# Patient Record
Sex: MALE | Race: WHITE | NOT HISPANIC OR LATINO | Employment: OTHER | URBAN - METROPOLITAN AREA
[De-identification: names, ages, dates, MRNs, and addresses within clinical notes are randomized per-mention and may not be internally consistent; named-entity substitution may affect disease eponyms.]

---

## 2019-09-11 ENCOUNTER — OFFICE VISIT (OUTPATIENT)
Dept: FAMILY MEDICINE | Facility: CLINIC | Age: 69
End: 2019-09-11
Payer: MEDICARE

## 2019-09-11 VITALS
TEMPERATURE: 97.7 F | DIASTOLIC BLOOD PRESSURE: 64 MMHG | RESPIRATION RATE: 16 BRPM | OXYGEN SATURATION: 98 % | HEART RATE: 79 BPM | WEIGHT: 175 LBS | SYSTOLIC BLOOD PRESSURE: 111 MMHG

## 2019-09-11 DIAGNOSIS — F19.20 POLYSUBSTANCE DEPENDENCE (H): ICD-10-CM

## 2019-09-11 DIAGNOSIS — C92.10 CML (CHRONIC MYELOCYTIC LEUKEMIA) (H): ICD-10-CM

## 2019-09-11 DIAGNOSIS — K59.09 OTHER CONSTIPATION: ICD-10-CM

## 2019-09-11 DIAGNOSIS — R29.6 FREQUENT FALLS: ICD-10-CM

## 2019-09-11 DIAGNOSIS — F19.97: ICD-10-CM

## 2019-09-11 DIAGNOSIS — E72.20 HYPERAMMONEMIA (H): ICD-10-CM

## 2019-09-11 DIAGNOSIS — F20.0 PARANOID SCHIZOPHRENIA (H): ICD-10-CM

## 2019-09-11 DIAGNOSIS — F60.0 PARANOID PERSONALITY (DISORDER) (H): ICD-10-CM

## 2019-09-11 DIAGNOSIS — R56.9 CONVULSIONS, UNSPECIFIED CONVULSION TYPE (H): ICD-10-CM

## 2019-09-11 DIAGNOSIS — B18.2 HEPATITIS C VIRUS CARRIER STATE (H): ICD-10-CM

## 2019-09-11 DIAGNOSIS — Z00.00 ROUTINE GENERAL MEDICAL EXAMINATION AT A HEALTH CARE FACILITY: Primary | ICD-10-CM

## 2019-09-11 DIAGNOSIS — H91.90 HEARING LOSS, UNSPECIFIED HEARING LOSS TYPE, UNSPECIFIED LATERALITY: ICD-10-CM

## 2019-09-11 DIAGNOSIS — Z23 NEED FOR PROPHYLACTIC VACCINATION AND INOCULATION AGAINST INFLUENZA: ICD-10-CM

## 2019-09-11 DIAGNOSIS — J44.9 CHRONIC OBSTRUCTIVE PULMONARY DISEASE, UNSPECIFIED COPD TYPE (H): ICD-10-CM

## 2019-09-11 DIAGNOSIS — F17.200 SMOKER: ICD-10-CM

## 2019-09-11 DIAGNOSIS — G40.419: ICD-10-CM

## 2019-09-11 DIAGNOSIS — R32 URINARY INCONTINENCE, UNSPECIFIED TYPE: ICD-10-CM

## 2019-09-11 DIAGNOSIS — S06.9X9S TRAUMATIC BRAIN INJURY WITH LOSS OF CONSCIOUSNESS, SEQUELA (H): ICD-10-CM

## 2019-09-11 DIAGNOSIS — K21.9 GASTROESOPHAGEAL REFLUX DISEASE, ESOPHAGITIS PRESENCE NOT SPECIFIED: ICD-10-CM

## 2019-09-11 DIAGNOSIS — H53.2 MONOCULAR DIPLOPIA: ICD-10-CM

## 2019-09-11 PROBLEM — G40.909 SEIZURE DISORDER (H): Status: ACTIVE | Noted: 2019-09-11

## 2019-09-11 PROCEDURE — 99204 OFFICE O/P NEW MOD 45 MIN: CPT | Mod: 25 | Performed by: PHYSICIAN ASSISTANT

## 2019-09-11 PROCEDURE — 90662 IIV NO PRSV INCREASED AG IM: CPT | Performed by: PHYSICIAN ASSISTANT

## 2019-09-11 PROCEDURE — G0008 ADMIN INFLUENZA VIRUS VAC: HCPCS | Performed by: PHYSICIAN ASSISTANT

## 2019-09-11 RX ORDER — ALBUTEROL SULFATE 90 UG/1
2 AEROSOL, METERED RESPIRATORY (INHALATION) EVERY 6 HOURS
Qty: 3 INHALER | Refills: 1 | Status: SHIPPED | OUTPATIENT
Start: 2019-09-11 | End: 2019-10-10

## 2019-09-11 RX ORDER — POLYETHYLENE GLYCOL 3350 17 G
POWDER IN PACKET (EA) ORAL
COMMUNITY
Start: 2017-06-16 | End: 2019-09-11

## 2019-09-11 RX ORDER — ACETAMINOPHEN 325 MG/1
TABLET ORAL
COMMUNITY
Start: 2019-02-02 | End: 2020-01-01

## 2019-09-11 RX ORDER — FAMOTIDINE 20 MG/1
20 TABLET, FILM COATED ORAL 2 TIMES DAILY
COMMUNITY
Start: 2019-07-04 | End: 2019-09-11

## 2019-09-11 RX ORDER — OXCARBAZEPINE 600 MG/1
600 TABLET, FILM COATED ORAL 2 TIMES DAILY
COMMUNITY
Start: 2019-08-29

## 2019-09-11 RX ORDER — QUETIAPINE FUMARATE 50 MG/1
50 TABLET, FILM COATED ORAL 4 TIMES DAILY
COMMUNITY
Start: 2019-09-04

## 2019-09-11 RX ORDER — BUDESONIDE AND FORMOTEROL FUMARATE DIHYDRATE 80; 4.5 UG/1; UG/1
2 AEROSOL RESPIRATORY (INHALATION) DAILY
Qty: 3 INHALER | Refills: 1 | Status: SHIPPED | OUTPATIENT
Start: 2019-09-11 | End: 2020-01-01 | Stop reason: DRUGHIGH

## 2019-09-11 RX ORDER — QUETIAPINE 300 MG/1
300 TABLET, FILM COATED, EXTENDED RELEASE ORAL AT BEDTIME
COMMUNITY
Start: 2019-03-19

## 2019-09-11 RX ORDER — VENLAFAXINE HYDROCHLORIDE 75 MG/1
75 CAPSULE, EXTENDED RELEASE ORAL DAILY
COMMUNITY
Start: 2019-08-29

## 2019-09-11 RX ORDER — NICOTINE 21 MG/24HR
1 PATCH, TRANSDERMAL 24 HOURS TRANSDERMAL EVERY 24 HOURS
Qty: 30 PATCH | Refills: 0 | Status: SHIPPED | OUTPATIENT
Start: 2019-09-11 | End: 2019-12-04

## 2019-09-11 RX ORDER — PHENYTOIN SODIUM 100 MG/1
100 CAPSULE, EXTENDED RELEASE ORAL 2 TIMES DAILY
COMMUNITY
Start: 2018-07-31

## 2019-09-11 RX ORDER — POLYETHYLENE GLYCOL 3350 17 G
POWDER IN PACKET (EA) ORAL
Qty: 169 LOZENGE | Refills: 1 | Status: SHIPPED | OUTPATIENT
Start: 2019-09-11 | End: 2019-10-25

## 2019-09-11 RX ORDER — HALOPERIDOL 10 MG/1
10 TABLET ORAL AT BEDTIME
COMMUNITY
Start: 2019-08-29

## 2019-09-11 RX ORDER — BUDESONIDE AND FORMOTEROL FUMARATE DIHYDRATE 80; 4.5 UG/1; UG/1
2 AEROSOL RESPIRATORY (INHALATION) DAILY
COMMUNITY
End: 2019-09-11

## 2019-09-11 RX ORDER — FAMOTIDINE 20 MG/1
20 TABLET, FILM COATED ORAL 2 TIMES DAILY
Qty: 180 TABLET | Refills: 1 | Status: SHIPPED | OUTPATIENT
Start: 2019-09-11 | End: 2020-01-01

## 2019-09-11 RX ORDER — HALOPERIDOL DECANOATE 100 MG/ML
100 INJECTION INTRAMUSCULAR
COMMUNITY
Start: 2019-08-21

## 2019-09-11 RX ORDER — CLONAZEPAM 0.5 MG/1
0.5 TABLET ORAL 2 TIMES DAILY
COMMUNITY
Start: 2019-09-06

## 2019-09-11 RX ORDER — ALBUTEROL SULFATE 1.25 MG/3ML
1.25 SOLUTION RESPIRATORY (INHALATION) EVERY 6 HOURS PRN
Qty: 3 ML | Refills: 3 | Status: SHIPPED | OUTPATIENT
Start: 2019-09-11 | End: 2019-12-04

## 2019-09-11 RX ORDER — NICOTINE 21 MG/24HR
1 PATCH, TRANSDERMAL 24 HOURS TRANSDERMAL EVERY 24 HOURS
Qty: 30 PATCH | Refills: 0 | Status: SHIPPED | OUTPATIENT
Start: 2019-09-11 | End: 2019-10-30

## 2019-09-11 NOTE — PROGRESS NOTES
"  SUBJECTIVE:   CC: Espinoza Willoughby is an 69 year old male who presents for preventive health visit.     Healthy Habits:    Do you get at least three servings of calcium containing foods daily (dairy, green leafy vegetables, etc.)? { :565659::\"yes\"}    Amount of exercise or daily activities, outside of work: { :310206}    Problems taking medications regularly { :042811::\"No\"}    Medication side effects: { :072814::\"No\"}    Have you had an eye exam in the past two years? { :877701}    Do you see a dentist twice per year? { :735317}    Do you have sleep apnea, excessive snoring or daytime drowsiness?{ :064203}  {Outside tests to abstract? :722264}    {additional problems to add (Optional):410538}    Today's PHQ-2 Score: No flowsheet data found.  {PHQ-2 LOOK IN ASSESSMENTS (Optional) :414358}  Abuse: Current or Past(Physical, Sexual or Emotional)- {YES/NO/NA:900431}  Do you feel safe in your environment? {YES/NO/NA:479025}    Social History     Tobacco Use     Smoking status: Not on file   Substance Use Topics     Alcohol use: Not on file     If you drink alcohol do you typically have >3 drinks per day or >7 drinks per week? {ETOH :804707}                      Last PSA: No results found for: PSA    Reviewed orders with patient. Reviewed health maintenance and updated orders accordingly - {Yes/No:796727::\"Yes\"}  {Chronicprobdata (Optional):536108}    Reviewed and updated as needed this visit by clinical staff         Reviewed and updated as needed this visit by Provider        {HISTORY OPTIONS (Optional):343911}    ROS:  { :839554::\"CONSTITUTIONAL: NEGATIVE for fever, chills, change in weight\",\"INTEGUMENTARY/SKIN: NEGATIVE for worrisome rashes, moles or lesions\",\"EYES: NEGATIVE for vision changes or irritation\",\"ENT: NEGATIVE for ear, mouth and throat problems\",\"RESP: NEGATIVE for significant cough or SOB\",\"CV: NEGATIVE for chest pain, palpitations or peripheral edema\",\"GI: NEGATIVE for nausea, abdominal pain, " "heartburn, or change in bowel habits\",\" male: negative for dysuria, hematuria, decreased urinary stream, erectile dysfunction, urethral discharge\",\"MUSCULOSKELETAL: NEGATIVE for significant arthralgias or myalgia\",\"NEURO: NEGATIVE for weakness, dizziness or paresthesias\",\"PSYCHIATRIC: NEGATIVE for changes in mood or affect\"}    OBJECTIVE:   There were no vitals taken for this visit.  EXAM:  {Exam Choices:814472}    {Diagnostic Test Results (Optional):381375::\"Diagnostic Test Results:\",\"Labs reviewed in Epic\"}    ASSESSMENT/PLAN:   {Diag Picklist:659056}    COUNSELING:  {MALE COUNSELING MESSAGES:076348::\"Reviewed preventive health counseling, as reflected in patient instructions\"}    There is no height or weight on file to calculate BMI.    {Weight Management Plan (ACO) Complete if BMI is abnormal-  Ages 18-64  BMI >24.9.  Age 65+ with BMI <23 or >30 (Optional):412591}     has no tobacco history on file.  {Tobacco Cessation -- Complete if patient is a smoker (Optional):325544}    Counseling Resources:  ATP IV Guidelines  Pooled Cohorts Equation Calculator  FRAX Risk Assessment  ICSI Preventive Guidelines  Dietary Guidelines for Americans, 2010  USDA's MyPlate  ASA Prophylaxis  Lung CA Screening    Andres Rubio PA-C  [facility]  "

## 2019-09-11 NOTE — PATIENT INSTRUCTIONS
Preventive Health Recommendations:     See your health care provider every year to    Review health changes.     Discuss preventive care.      Review your medicines if your doctor has prescribed any.      Talk with your health care provider about whether you should have a test to screen for prostate cancer (PSA).    Every 3 years, have a diabetes test (fasting glucose). If you are at risk for diabetes, you should have this test more often.    Every 5 years, have a cholesterol test. Have this test more often if you are at risk for high cholesterol or heart disease.     Every 10 years, have a colonoscopy. Or, have a yearly FIT test (stool test). These exams will check for colon cancer.    Talk to with your health care provider about screening for Abdominal Aortic Aneurysm if you have a family history of AAA or have a history of smoking.    Shots:     Get a flu shot each year.     Get a tetanus shot every 10 years.     Talk to your doctor about your pneumonia vaccines. There are now two you should receive - Pneumovax (PPSV 23) and Prevnar (PCV 13).     Talk to your pharmacist about a shingles vaccine.     Talk to your doctor about the hepatitis B vaccine.  Nutrition:     Eat at least 5 servings of fruits and vegetables each day.     Eat whole-grain bread, whole-wheat pasta and brown rice instead of white grains and rice.     Get adequate Calcium and Vitamin D.   Lifestyle    Exercise for at least 150 minutes a week (30 minutes a day, 5 days a week). This will help you control your weight and prevent disease.     Limit alcohol to one drink per day.     No smoking.     Wear sunscreen to prevent skin cancer.    See your dentist every six months for an exam and cleaning.    See your eye doctor every 1 to 2 years to screen for conditions such as glaucoma, macular degeneration, cataracts, etc.    Personalized Prevention Plan  You are due for the preventive services outlined below.  Your care team is available to assist you  in scheduling these services.  If you have already completed any of these items, please share that information with your care team to update in your medical record.  Health Maintenance Due   Topic Date Due     Hepatitis C Screening  1950     Discuss Advance Care Planning  1950     Colonscopy  07/17/1960     Diptheria Tetanus Pertussis (DTAP/TDAP/TD) Vaccine (1 - Tdap) 07/17/1975     Cholesterol Lab  07/17/1985     Annual Wellness Visit  07/17/2015     FALL RISK ASSESSMENT  07/17/2015     Pneumococcal Vaccine (1 of 2 - PCV13) 07/17/2015     AORTIC ANEURYSM SCREENING (SYSTEM ASSIGNED)  07/17/2015     Zoster (Shingles) Vaccine (2 of 3) 12/26/2016     PHQ-2  01/01/2019     Flu Vaccine (1) 09/01/2019

## 2019-09-11 NOTE — TELEPHONE ENCOUNTER
ONCOLOGY INTAKE: Records Information      APPT INFORMATION: [id number] Dr. Apollo Yuan  Referring provider:  Andres Rubio  Referring provider s clinic:   [facility]  Reason for visit/diagnosis:  CML (chronic myelocytic leukemia) (H) [C92.10]  Has patient been notified of appointment date and time?: Yes    RECORDS INFORMATION:  Were the records received with the referral (via [employer])? Internal Referral    Has patient been seen for any external appt for this diagnosis? No    If yes, where? NA    Has patient had any imaging or procedures outside of [facility] for this condition? No      If Yes, where? NA    ADDITIONAL INFORMATION:  None

## 2019-09-11 NOTE — PROGRESS NOTES
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 (H) (9/11/2019), Hearing loss, unspecified hearing loss type, unspecified laterality (9/11/2019), Hepatitis C virus carrier state (H) (9/11/2019), Hyperammonemia (H) (9/11/2019), Monocular diplopia (9/11/2019), Other constipation (9/11/2019), Paranoid personality (disorder) (H) (9/11/2019), Paranoid schizophrenia (H) (9/11/2019), Polysubstance dependence (H) (9/11/2019), Seizure disorder (H) (9/11/2019), Smoker (9/11/2019), Substance-induced persisting dementia (H) (9/11/2019), Traumatic brain injury with loss of consciousness, sequela (H) (9/11/2019), and Urinary incontinence, unspecified type (9/11/2019).   who presents to clinic today for the following health issues:    Preventive Visit. New patient to me to establish care.   He has moved to the area from [place]. No records available.   Lives in group home and here with 2 care givers.     Are you in the first 12 months of your Medicare Part B coverage?  No    Physical Health:    In general, how would you rate your overall physical health? poor    Outside of work, how many days during the week do you exercise? none    Outside of work, approximately how many minutes a day do you exercise?not applicable    If you drink alcohol do you typically have >3 drinks per day or >7 drinks per week? No    Do you usually eat at least 4 servings of fruit and vegetables a day, include whole grains & fiber and avoid regularly eating high fat or \"junk\" foods? Yes    Do you have any problems taking medications regularly?  No    Do you have any side effects from medications? none    Needs assistance for the " following daily activities: currently as he is going through chemo    Which of the following safety concerns are present in your home?  none identified     Hearing impairment: No    In the past 6 months, have you been bothered by leaking of urine? no    Mental Health:    In general, how would you rate your overall mental or emotional health? Fair going through chemo  PHQ-2 Score:      Do you feel safe in your environment? Yes        Additional concerns to address?  YES    Fall risk:  Fallen 2 or more times in the past year?: Yes  Any fall with injury in the past year?: No  Timed Up and Go Test (>13.5 is fall risk; contact physician) : 6  click delete button to remove this line now  Cognitive Screening: Not appropriate due to known dementia/ TBI      Here with care givers and no past medical history information. New to facility for about 1 wk.     Here to establish care :  Unsteady gait would like handicap parking permit- he don't drive.   Leukemia- history of CML and establishing new oncology  Copd concerns as he has chronic bronchitis  Discuss quitting smoking  History of mental health and TBI.   - see's oncology, neurology and psychiatry in the past and needs referrals.       Reviewed and updated as needed this visit by clinical staff  Tobacco  Allergies  Meds  Problems  Med Hx  Surg Hx  Fam Hx  Soc Hx          Reviewed and updated as needed this visit by Provider  Tobacco  Allergies  Meds  Problems  Med Hx  Surg Hx  Fam Hx        Social History     Tobacco Use     Smoking status: Current Every Day Smoker     Smokeless tobacco: Never Used   Substance Use Topics     Alcohol use: Yes                           Current providers sharing in care for this patient include:   Patient Care Team:  Clinic, [facility] as PCP - General (Clinic)  Clinic [facility] (Clinic)    The following health maintenance items are reviewed in Epic and correct as of today:  Health Maintenance   Topic Date Due      SPIROMETRY  1950     HEPATITIS C SCREENING  1950     ADVANCE CARE PLANNING  1950     COPD ACTION PLAN  1950     COLONOSCOPY  07/17/1960     LIPID  07/17/1985     MEDICARE ANNUAL WELLNESS VISIT  07/17/2015     AORTIC ANEURYSM SCREENING (SYSTEM ASSIGNED)  07/17/2015     ZOSTER IMMUNIZATION (2 of 3) 12/26/2016     INFLUENZA VACCINE (1) 09/01/2019     FALL RISK ASSESSMENT  09/11/2020     DTAP/TDAP/TD IMMUNIZATION (2 - Td) 06/28/2021     PHQ-2  Completed     PNEUMOCOCCAL IMMUNIZATION 65+ HIGH/HIGHEST RISK  Completed     IPV IMMUNIZATION  Aged Out     MENINGITIS IMMUNIZATION  Aged Out     Labs reviewed in EPIC      ROS:  Constitutional, HEENT, cardiovascular, pulmonary, gi and gu systems are negative, except as otherwise noted.    OBJECTIVE:   /64   Pulse 79   Temp 97.7  F (36.5  C) (Oral)   Resp 16   Wt 79.4 kg (175 lb)   SpO2 98%  There is no height or weight on file to calculate BMI.  EXAM:   GENERAL: healthy, alert and no distress  EYES: Eyes grossly normal to inspection, PERRL and conjunctivae and sclerae normal  HENT: ear canals and TM's normal, nose and mouth without ulcers or lesions  NECK: no adenopathy, no asymmetry, masses, or scars and thyroid normal to palpation  RESP: lungs clear to auscultation - no rales, rhonchi or wheezes  CV: regular rate and rhythm, normal S1 S2, no S3 or S4, no murmur, click or rub, no peripheral edema and peripheral pulses strong  ABDOMEN: soft, nontender, no hepatosplenomegaly, no masses and bowel sounds normal   (male): deferred  NEURO: Normal strength and tone, mentation intact and speech normal  PSYCH: mentation appears flat, random thoughts.     Diagnostic Test Results:  Labs reviewed in Epic    ASSESSMENT / PLAN:       ICD-10-CM    1. Routine general medical examination at a health care facility Z00.00    2. Traumatic brain injury with loss of consciousness, sequela (H) S06.9X9S aspirin (ASPIRIN) 81 MG EC tablet     order for [facility]      NEUROLOGY ADULT REFERRAL   3. CML (chronic myelocytic leukemia) (H) C92.10 Oncology/Hematology Adult Referral   4. Smoker F17.200 nicotine (COMMIT) 2 MG lozenge     nicotine (NICODERM CQ) 21 MG/24HR 24 hr patch     nicotine (NICODERM CQ) 14 MG/24HR 24 hr patch     nicotine (NICODERM CQ) 7 MG/24HR 24 hr patch   5. Chronic obstructive pulmonary disease, unspecified COPD type (H) J44.9 budesonide-formoterol (SYMBICORT) 80-4.5 MCG/ACT Inhaler     albuterol (PROAIR HFA/PROVENTIL HFA/VENTOLIN HFA) 108 (90 Base) MCG/ACT inhaler     albuterol (ACCUNEB) 1.25 MG/3ML neb solution   6. Gastroesophageal reflux disease, esophagitis presence not specified K21.9 famotidine (PEPCID) 20 MG tablet   7. Hearing loss, unspecified hearing loss type, unspecified laterality H91.90 AUDIOLOGY ADULT REFERRAL   8. Urinary incontinence, unspecified type R32 order for DME   9. Frequent falls R29.6 order for DME   10. Paranoid schizophrenia (H) F20.0    11. Monocular diplopia H53.2    12. Convulsions, unspecified convulsion type (H) R56.9    13. Hepatitis C virus carrier state (H) B18.2    14. Other constipation K59.09    15. Hyperammonemia (H) E72.20    16. Polysubstance dependence (H) F19.20    17. Substance-induced persisting dementia (H) F19.97    18. Paranoid personality (disorder) (H) F60.0    19. 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 Need for prophylactic vaccination and inoculation against influenza Z23 FLU VACCINE, INCREASED ANTIGEN, PRESV FREE, AGE 65+ [45871]     ADMIN INFLUENZA (For MEDICARE Patients ONLY) []   history of multiple medical problems.   Is going to establish with psychiatry at [facility].   Referrals placed for oncology and neurology.   warning signs discussed.  meds refilled.   Recheck 6 month.     End of Life Planning:  Patient currently has an advanced directive: unknown    COUNSELING:  Reviewed preventive health counseling, as reflected in patient instructions       Regular exercise        Healthy diet/nutrition       Hearing screening    There is no height or weight on file to calculate BMI.         reports that he has been smoking.  He has never used smokeless tobacco.  Tobacco Cessation Action Plan: Pharmacotherapies : Nicotine patch    Appropriate preventive services were discussed with this patient, including applicable screening as appropriate for cardiovascular disease, diabetes, osteopenia/osteoporosis, and glaucoma.  As appropriate for age/gender, discussed screening for colorectal cancer, prostate cancer, breast cancer, and cervical cancer. Checklist reviewing preventive services available has been given to the patient.    Reviewed patients plan of care and provided an AVS. The Basic Care Plan (routine screening as documented in Health Maintenance) for Espinoza meets the Care Plan requirement. This Care Plan has been established and reviewed with the Patient and caregiver.    Counseling Resources:  ATP IV Guidelines  Pooled Cohorts Equation Calculator  Breast Cancer Risk Calculator  FRAX Risk Assessment  ICSI Preventive Guidelines  Dietary Guidelines for Americans, 2010  USDA's MyPlate  ASA Prophylaxis  Lung CA Screening    Andres Rubio PA-C  [facility]

## 2019-09-12 ENCOUNTER — TELEPHONE (OUTPATIENT)
Dept: FAMILY MEDICINE | Facility: CLINIC | Age: 69
End: 2019-09-12

## 2019-09-12 DIAGNOSIS — S06.9X9S TRAUMATIC BRAIN INJURY WITH LOSS OF CONSCIOUSNESS, SEQUELA (H): Primary | ICD-10-CM

## 2019-09-12 DIAGNOSIS — J44.9 CHRONIC OBSTRUCTIVE PULMONARY DISEASE, UNSPECIFIED COPD TYPE (H): Primary | ICD-10-CM

## 2019-09-12 RX ORDER — ALBUTEROL SULFATE 90 UG/1
2 AEROSOL, METERED RESPIRATORY (INHALATION) EVERY 6 HOURS
Qty: 3 INHALER | Refills: 0 | Status: SHIPPED | OUTPATIENT
Start: 2019-09-12 | End: 2019-10-10

## 2019-09-12 NOTE — TELEPHONE ENCOUNTER
[facility] was recommended below but they don't see patients for Neurology, just mainly OT & PT.  Does the patient need OT/PT services?  Or do you want to refer him to Neurology such as [facility]?  Please clarify.  Thank you.  Tabby Mello,

## 2019-09-12 NOTE — TELEPHONE ENCOUNTER
LVM for pt re: [facility]    Imaging from  Requested, reports in CE.     RECORDS STATUS - ALL OTHER DIAGNOSIS      [facility] Tracking ( - Path): [id number]    RECORDS RECEIVED FROM: [facility]   DATE RECEIVED:    NOTES STATUS DETAILS   OFFICE NOTE from referring provider Epic FP: [place]  Recs from  in [facility] - Requested    OFFICE NOTE from medical oncologist Epic/ Onc: 8/23/19 - Laura Palacios   DISCHARGE SUMMARY from hospital     DISCHARGE REPORT from the ER [facility]/ 7/8/19, 6/28/19, 6/27/19   OPERATIVE REPORT     MEDICATION LIST     CLINICAL TRIAL TREATMENTS TO DATE     LABS     PATHOLOGY REPORTS , Report in CE, Slides received from  and taken to pathology 9/20/19 Date of Collection: 7/10/19, Case Number: [id number]   ANYTHING RELATED TO DIAGNOSIS Epic/ 7/25/19   GENONOMIC TESTING     TYPE:     IMAGING (NEED IMAGES & REPORT)     XR Chest , Report in CE, Requested 7/9/19, 6/28/19   CT SCANS , Report in CE, Requested 7/15/19   MRI     MAMMO     ULTRASOUND     PET

## 2019-09-12 NOTE — TELEPHONE ENCOUNTER
----- Message from Kavya Turcios sent at 9/12/2019 12:35 PM CDT -----  Regarding: Neuro TBI referral, dx not seen at [facility] or   Hello,  Provider Andres Rubio has placed a neurology referral for this patient however, [facility] nor [facility] see for the diagnosis Traumatic brain injury with loss of consciousness, sequela. These are referred to [facility]: [phone] or [facility]: [phone].  Please assist your patient at scheduling an alternative location. Thanks!    Thank you kindly,  Kavya BECKFORD

## 2019-09-17 ENCOUNTER — TRANSFERRED RECORDS (OUTPATIENT)
Dept: HEALTH INFORMATION MANAGEMENT | Facility: CLINIC | Age: 69
End: 2019-09-17

## 2019-09-18 NOTE — TELEPHONE ENCOUNTER
Spoke w/ Jami @ Pt's Group Home, she is is Care Coordinator - She did not seem to know that patient had an appointment scheduled with the [facility] - there are numerous references to [facility], as recent as [email or web address] (documentation only, [facility] Care [facility] - [place])     Pt has an appointment on 9/20 @ [facility] @ 12pm. Jami did not realize we were separate entities. Per Jami, they will keep this appointment. Per her, all other recs rec'd.  2:53 PM

## 2019-09-20 ENCOUNTER — PRE VISIT (OUTPATIENT)
Dept: TRANSPLANT | Facility: CLINIC | Age: 69
End: 2019-09-20

## 2019-09-23 PROCEDURE — 00000345 ZZHCL STATISTIC REV BONE MARROW OUTSIDE SLIDES TC 88321: Performed by: INTERNAL MEDICINE

## 2019-09-24 ENCOUNTER — TRANSFERRED RECORDS (OUTPATIENT)
Dept: HEALTH INFORMATION MANAGEMENT | Facility: CLINIC | Age: 69
End: 2019-09-24

## 2019-09-24 LAB — COPATH REPORT: NORMAL

## 2019-09-27 ENCOUNTER — TELEPHONE (OUTPATIENT)
Dept: ONCOLOGY | Facility: CLINIC | Age: 69
End: 2019-09-27

## 2019-09-27 NOTE — TELEPHONE ENCOUNTER
Spoke briefly with Tesfaye, staff member at the [facility] where Espinoza lives.  He stated Jami, supervisor, is gone for the day but will be returning on Monday.  He went on to say this is best number to reach her.

## 2019-09-30 ENCOUNTER — TELEPHONE (OUTPATIENT)
Dept: ONCOLOGY | Facility: CLINIC | Age: 69
End: 2019-09-30

## 2019-09-30 NOTE — TELEPHONE ENCOUNTER
Spoke with Daphne, at pt's group home.  She reported that Jami is out today hopefully returning tomorrow.  RN left request for Jami to call back to verify pt is being seen for his hematologic issue; know he may be going to [facility].  Also left direct, not for pt use phone number for Jami to return call.

## 2019-10-01 PROBLEM — M14.679: Status: ACTIVE | Noted: 2017-07-17

## 2019-10-01 PROBLEM — D72.828 NEUTROPHILIC LEUKOCYTOSIS: Status: ACTIVE | Noted: 2019-07-09

## 2019-10-01 PROBLEM — F63.9 IMPULSE CONTROL DISORDER: Status: ACTIVE | Noted: 2019-01-09

## 2019-10-01 PROBLEM — Z79.899 POLYPHARMACY: Status: ACTIVE | Noted: 2018-10-03

## 2019-10-01 PROBLEM — I95.1 ORTHOSTATIC HYPOTENSION: Status: ACTIVE | Noted: 2019-07-15

## 2019-10-01 PROBLEM — R91.8 GROUND GLASS OPACITY PRESENT ON IMAGING OF LUNG: Status: ACTIVE | Noted: 2019-07-12

## 2019-10-01 PROBLEM — J41.8 MIXED SIMPLE AND MUCOPURULENT CHRONIC BRONCHITIS (H): Status: ACTIVE | Noted: 2017-12-21

## 2019-10-01 PROBLEM — D64.9 NORMOCHROMIC NORMOCYTIC ANEMIA: Status: ACTIVE | Noted: 2019-07-11

## 2019-10-01 RX ORDER — DEXAMETHASONE 4 MG/1
TABLET ORAL
Qty: 12 G | Refills: 11 | OUTPATIENT
Start: 2019-10-01

## 2019-10-01 NOTE — TELEPHONE ENCOUNTER
Routing refill request to provider for review/approval because:  Drug not active on patient's medication list    Larissa SUEN, RN

## 2019-10-09 ENCOUNTER — OFFICE VISIT (OUTPATIENT)
Dept: URGENT CARE | Facility: URGENT CARE | Age: 69
End: 2019-10-09
Payer: MEDICARE

## 2019-10-09 VITALS
TEMPERATURE: 99.2 F | DIASTOLIC BLOOD PRESSURE: 61 MMHG | HEART RATE: 103 BPM | SYSTOLIC BLOOD PRESSURE: 133 MMHG | RESPIRATION RATE: 16 BRPM | OXYGEN SATURATION: 97 % | WEIGHT: 180 LBS
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
## 2019-10-09 DIAGNOSIS — T17.320A CHOKING DUE TO FOOD (REGURGITATED), INITIAL ENCOUNTER: Primary | ICD-10-CM

## 2019-10-09 PROCEDURE — 99214 OFFICE O/P EST MOD 30 MIN: CPT | Performed by: INTERNAL MEDICINE

## 2019-10-09 ASSESSMENT — PAIN SCALES - GENERAL: PAINLEVEL: NO PAIN (0)

## 2019-10-09 NOTE — PROGRESS NOTES
Subjective     Espinoza Willoughby is a 69 year old male who presents to clinic today for the following health issues:    HPI       Hospital Follow-up Visit:    Hospital/Nursing Home/IP Rehab Facility: [facility]  Date of Admission: 10/4/19  Date of Discharge: 10/7/19  Reason(s) for Admission: chest pain, diarrhea            Problems taking medications regularly:  None       Medication changes since discharge: see med list        Problems adhering to non-medication therapy:  None    Summary of hospitalization:  CareEverywhere information obtained and reviewed  BRIEF HOSPITAL COURSE     Espinoza Willoughby is a 69 y.o. male with a PMH notable for:   Schizoaffective, Leukemia, HTN, admitted due to chest pain. He thinks it is his chemo drugs.  Work up was negative for ischemia.   Social work asked to see regarding history mental difficulties.  He has dx of paranoid schizophrenia per niece. He lives in assisted living now.  PT OT recc's STR but group home thinks they can manage him. Nothing medical here of concern. -     Atypical chest pain   - neg work up so far  - looks non cardiac    Diagnostic Tests/Treatments reviewed.  Follow up needed: none  Other Healthcare Providers Involved in Patient s Care:         None  Update since discharge: improved.       Post Discharge Medication Reconciliation: discharge medications reconciled and changed, per note/orders (see AVS).  Plan of care communicated with caregiver     Coding guidelines for this visit:  Type of Medical   Decision Making Face-to-Face Visit       within 7 Days of discharge Face-to-Face Visit        within 14 days of discharge   Moderate Complexity [id number]   High Complexity [id number]        Also had a choking episode the other day and was seen in urgent care.  Please see note in chart for details.  He states he is doing better he has no concerns.  He denies any chest pain shortness of breath or any fevers.    Does have a history of off-and-on constipation.  He  states he uses prune juice but is not working very well up.  Occasionally will use MiraLAX.  He like a prescription for that.    Caregiver states that there is some medications that he is currently not taking and would like that updated and sent to his pharmacy group.    He does see his psychiatrist every couple months and oncologist.    Patient Active Problem List   Diagnosis     Paranoid schizophrenia (H)     Monocular diplopia     Seizure disorder (H)     Hepatitis C virus carrier state (H)     Other constipation     Hyperammonemia (H)     Polysubstance dependence (H)     Substance-induced persisting dementia (H)     Paranoid personality (disorder) (H)     Grand mal seizure with intractable epilepsy (H)     Traumatic brain injury with loss of consciousness, sequela (H)     CML (chronic myelocytic leukemia) (H)     Smoker     Chronic obstructive pulmonary disease, unspecified COPD type (H)     Gastroesophageal reflux disease, esophagitis presence not specified     Frequent falls     Urinary incontinence, unspecified type     Hearing loss, unspecified hearing loss type, unspecified laterality     Impulse control disorder     Bilateral high frequency sensorineural hearing loss     Cervical disc displacement     Charcot's joint of foot, unspecified laterality     Chronic hepatitis C (H)     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, sequela (H)     Ground glass opacity present on imaging of lung     History of foot ulcer     Mixed simple and mucopurulent chronic bronchitis (H)     Neutrophilic leukocytosis     Normochromic normocytic anemia     Orthostatic hypotension     Polypharmacy     Vitamin D deficiency     No past surgical history on file.    Social History     Tobacco Use     Smoking status: Current Every Day Smoker     Smokeless tobacco: Never Used   Substance Use Topics     Alcohol use: Yes     No family history on file.      Current Outpatient Medications   Medication Sig Dispense Refill      acetaminophen (MAPAP) 325 MG tablet TAKE TWO  TABLETS (650MG) BY MOUTH EVERY SIX  HOURS AS NEEDED       albuterol (ACCUNEB) 1.25 MG/3ML neb solution Take 1 vial (1.25 mg) by nebulization every 6 hours as needed for shortness of breath / dyspnea or wheezing 3 mL 3     aspirin (ASPIRIN) 81 MG EC tablet Take 1 tablet (81 mg) by mouth daily 90 tablet 3     budesonide-formoterol (SYMBICORT) 80-4.5 MCG/ACT Inhaler Inhale 2 puffs into the lungs daily 3 Inhaler 1     clonazePAM (KLONOPIN) 0.5 MG tablet Take 0.5 mg by mouth 2 times daily        famotidine (PEPCID) 20 MG tablet Take 1 tablet (20 mg) by mouth 2 times daily 180 tablet 1     haloperidol (HALDOL) 10 MG tablet 10 mg At Bedtime        haloperidol decanoate (HALDOL DECANOATE) 100 MG/ML injection Inject 100 mg into the muscle every 30 days        nicotine (COMMIT) 2 MG lozenge Take 1 lozenge by mouth as needed, maximum of 10 lozenges per day. 169 lozenge 1     nicotine (NICODERM CQ) 7 MG/24HR 24 hr patch Place 1 patch onto the skin every 24 hours 30 patch 0     order for DME Equipment being ordered: Adult Briefs 3 Box 11     order for DME Equipment being ordered: [place] belt 1 Device 1     OXcarbazepine (TRILEPTAL) 600 MG tablet Take 600 mg by mouth 2 times daily        phenytoin (DILANTIN) 100 MG capsule Take 100 mg by mouth 2 times daily        polyethylene glycol (MIRALAX/GLYCOLAX) powder Take 1-2 tsp daily as needed 1 Bottle 3     QUEtiapine (SEROQUEL) 50 MG tablet Take 50 mg by mouth 4 times daily        QUEtiapine ER (SEROQUEL XR) 300 MG 24 hr tablet Take 300 mg by mouth At Bedtime 2 caps at bedtime       venlafaxine (EFFEXOR-XR) 75 MG 24 hr capsule Take 75 mg by mouth daily 3 caps every am       nicotine (NICODERM CQ) 14 MG/24HR 24 hr patch Place 1 patch onto the skin every 24 hours (Patient not taking: Reported on 10/10/2019) 30 patch 0     nicotine (NICODERM CQ) 21 MG/24HR 24 hr patch Place 1 patch onto the skin every 24 hours (Patient not taking: Reported on  10/10/2019) 30 patch 0     Allergies   Allergen Reactions     Benadryl [Diphenhydramine]      Bupropion      Seizure     Xanax [Alprazolam]      No lab results found.   BP Readings from Last 3 Encounters:   10/10/19 99/61   10/09/19 133/61   09/11/19 111/64    Wt Readings from Last 3 Encounters:   10/10/19 80.7 kg (178 lb)   10/09/19 81.6 kg (180 lb)   09/11/19 79.4 kg (175 lb)            Reviewed and updated as needed this visit by Provider         Review of Systems   ROS COMP: Constitutional, HEENT, cardiovascular, pulmonary, gi and gu systems are negative, except as otherwise noted.      Objective    BP 99/61   Pulse 89   Temp 98.1  F (36.7  C) (Oral)   Resp 16   Wt 80.7 kg (178 lb)   SpO2 99%   There is no height or weight on file to calculate BMI.  Physical Exam   GENERAL: healthy, alert and no distress  EYES: Eyes grossly normal to inspection, PERRL and conjunctivae and sclerae normal  HENT: ear canals and TM's normal, nose and mouth without ulcers or lesions  NECK: no adenopathy, no asymmetry, masses, or scars and thyroid normal to palpation  RESP: lungs clear to auscultation - no rales, rhonchi or wheezes  CV: regular rate and rhythm, normal S1 S2, no S3 or S4, no murmur, click or rub, no peripheral edema and peripheral pulses strong  ABDOMEN: soft, nontender, no hepatosplenomegaly, no masses and bowel sounds normal    Diagnostic Test Results:  none         Assessment & Plan       ICD-10-CM    1. Other constipation K59.09 polyethylene glycol (MIRALAX/GLYCOLAX) powder   2. Other chest pain R07.89    3. Paranoid schizophrenia (H) F20.0    1.  Okay for MiraLAX as needed  2. symptoms are stable/resolved.  Follow-up 6 months as needed.      Return in about 6 months (around 4/10/2020) for recheck, As Needed.    Andres Rubio PA-C  [facility]

## 2019-10-09 NOTE — PROGRESS NOTES
SUBJECTIVE:  Espinoza Willoughby is an 69 year old male who presents for choked while eating dinner.  Doesn't have teeth so occasionally gags some and will cough to clear.  Today however he received the Heimlich and after three thrusts seemed okay.   Occurred around 4:30.  Normal breathing.  No pain in chest.  No vomiting.  Feels okay now.   Hx provided largely through care worker and some through pt.    PMH:   has a past medical history of Chronic obstructive pulmonary disease, unspecified COPD type (H) (9/11/2019), CML (chronic myelocytic leukemia) (H) (9/11/2019), Frequent falls (9/11/2019), Gastroesophageal reflux disease, esophagitis presence not specified (9/11/2019), Grand mal seizure with intractable epilepsy (H) (9/11/2019), Hearing loss, unspecified hearing loss type, unspecified laterality (9/11/2019), Hepatitis C virus carrier state (H) (9/11/2019), Hyperammonemia (H) (9/11/2019), Monocular diplopia (9/11/2019), Other constipation (9/11/2019), Paranoid personality (disorder) (H) (9/11/2019), Paranoid schizophrenia (H) (9/11/2019), Polysubstance dependence (H) (9/11/2019), Seizure disorder (H) (9/11/2019), Smoker (9/11/2019), Substance-induced persisting dementia (H) (9/11/2019), Traumatic brain injury with loss of consciousness, sequela (H) (9/11/2019), and Urinary incontinence, unspecified type (9/11/2019).  Patient Active Problem List   Diagnosis     Paranoid schizophrenia (H)     Monocular diplopia     Seizure disorder (H)     Hepatitis C virus carrier state (H)     Other constipation     Hyperammonemia (H)     Polysubstance dependence (H)     Substance-induced persisting dementia (H)     Paranoid personality (disorder) (H)     Grand mal seizure with intractable epilepsy (H)     Traumatic brain injury with loss of consciousness, sequela (H)     CML (chronic myelocytic leukemia) (H)     Smoker     Chronic obstructive pulmonary disease, unspecified COPD type (H)     Gastroesophageal reflux disease,  esophagitis presence not specified     Frequent falls     Urinary incontinence, unspecified type     Hearing loss, unspecified hearing loss type, unspecified laterality     Impulse control disorder     Bilateral high frequency sensorineural hearing loss     Cervical disc displacement     Charcot's joint of foot, unspecified laterality     Chronic hepatitis C (H)     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, sequela (H)     Ground glass opacity present on imaging of lung     History of foot ulcer     Mixed simple and mucopurulent chronic bronchitis (H)     Neutrophilic leukocytosis     Normochromic normocytic anemia     Orthostatic hypotension     Polypharmacy     Vitamin D deficiency     Social History     Socioeconomic History     Marital status: Single     Spouse name: None     Number of children: None     Years of education: None     Highest education level: None   Occupational History     None   Social Needs     Financial resource strain: None     Food insecurity:     Worry: None     Inability: None     Transportation needs:     Medical: None     Non-medical: None   Tobacco Use     Smoking status: Current Every Day Smoker     Smokeless tobacco: Never Used   Substance and Sexual Activity     Alcohol use: Yes     Drug use: None     Sexual activity: Yes   Lifestyle     Physical activity:     Days per week: None     Minutes per session: None     Stress: None   Relationships     Social connections:     Talks on phone: None     Gets together: None     Attends Islam service: None     Active member of club or organization: None     Attends meetings of clubs or organizations: None     Relationship status: None     Intimate partner violence:     Fear of current or ex partner: None     Emotionally abused: None     Physically abused: None     Forced sexual activity: None   Other Topics Concern     None   Social History Narrative     None     No family history on file.    ALLERGIES:  Benadryl [diphenhydramine];  Bupropion; and Xanax [alprazolam]    Current Outpatient Medications   Medication     acetaminophen (MAPAP) 325 MG tablet     albuterol (ACCUNEB) 1.25 MG/3ML neb solution     albuterol (PROAIR HFA/PROVENTIL HFA/VENTOLIN HFA) 108 (90 Base) MCG/ACT inhaler     albuterol (PROAIR HFA/PROVENTIL HFA/VENTOLIN HFA) 108 (90 Base) MCG/ACT inhaler     aspirin (ASPIRIN) 81 MG EC tablet     budesonide-formoterol (SYMBICORT) 80-4.5 MCG/ACT Inhaler     clonazePAM (KLONOPIN) 0.5 MG tablet     famotidine (PEPCID) 20 MG tablet     haloperidol (HALDOL) 10 MG tablet     haloperidol decanoate (HALDOL DECANOATE) 100 MG/ML injection     nicotine (COMMIT) 2 MG lozenge     nicotine (NICODERM CQ) 14 MG/24HR 24 hr patch     nicotine (NICODERM CQ) 21 MG/24HR 24 hr patch     nicotine (NICODERM CQ) 7 MG/24HR 24 hr patch     order for DME     order for DME     OXcarbazepine (TRILEPTAL) 600 MG tablet     phenytoin (DILANTIN) 100 MG capsule     QUEtiapine (SEROQUEL) 50 MG tablet     QUEtiapine ER (SEROQUEL XR) 300 MG 24 hr tablet     venlafaxine (EFFEXOR-XR) 75 MG 24 hr capsule     No current facility-administered medications for this visit.          ROS:  ROS is done and is negative for general/constitutional, eye, ENT, Respiratory, cardiovascular, GI, , Skin, musculoskeletal except as noted elsewhere.  All other review of systems negative except as noted elsewhere.      OBJECTIVE:  /61   Pulse 103   Temp 99.2  F (37.3  C) (Tympanic)   Resp 16   Wt 81.6 kg (180 lb)   SpO2 97%   GENERAL APPEARANCE: Alert, in no acute distress  EYES: normal  NOSE:normal  OROPHARYNX:normal except no teeth  NECK:No adenopathy,masses or thyromegaly  RESP: normal and clear to auscultation  CV:regular rate and rhythm and no murmurs, clicks, or gallops  ABDOMEN: Abdomen soft, non-tender. BS normal. No masses, organomegaly  SKIN: no ulcers, lesions or rash  MUSCULOSKELETAL:Musculoskeletal normal.  Chest wall, ribs and back are non-tender.      RESULTS  .  No  results found for this or any previous visit (from the past 48 hour(s)).    ASSESSMENT/PLAN:    ASSESSMENT / PLAN:  (T17.320A) Choking due to food (regurgitated), initial encounter  (primary encounter diagnosis)  Comment: episode of choking resolved with Heimlich maneuver.  No evidence of musculoskeletal trauma or fractures on exam. No evidence of pneumonia at this time. Breathing is normal.  Plan: I reviewed supportive care, and signs of concern including shortness of breath, trouble breathing, cough, or fever.  Follow up as needed or if worsens in any way.  Reviewed red flag symptoms and is to go to the ER if experiences any of these.  Also reviewed choking prevention when eating.      See [facility] for orders, medications, letters, patient instructions    Ekaterina Kern M.D.

## 2019-10-09 NOTE — NURSING NOTE
Chief Complaint   Patient presents with     Choking     pt had a choking episode at about 4:30 today        Initial /61   Pulse 103   Temp 99.2  F (37.3  C) (Tympanic)   Resp 16   Wt 81.6 kg (180 lb)   SpO2 97%  There is no height or weight on file to calculate BMI.  Medication Reconciliation: complete  Keri Dow MA

## 2019-10-10 ENCOUNTER — TELEPHONE (OUTPATIENT)
Dept: FAMILY MEDICINE | Facility: CLINIC | Age: 69
End: 2019-10-10

## 2019-10-10 ENCOUNTER — OFFICE VISIT (OUTPATIENT)
Dept: FAMILY MEDICINE | Facility: CLINIC | Age: 69
End: 2019-10-10
Payer: MEDICARE

## 2019-10-10 VITALS
RESPIRATION RATE: 16 BRPM | HEART RATE: 89 BPM | WEIGHT: 178 LBS | TEMPERATURE: 98.1 F | SYSTOLIC BLOOD PRESSURE: 99 MMHG | DIASTOLIC BLOOD PRESSURE: 61 MMHG | OXYGEN SATURATION: 99 %

## 2019-10-10 DIAGNOSIS — R07.89 OTHER CHEST PAIN: ICD-10-CM

## 2019-10-10 DIAGNOSIS — K59.09 OTHER CONSTIPATION: Primary | ICD-10-CM

## 2019-10-10 DIAGNOSIS — F20.0 PARANOID SCHIZOPHRENIA (H): ICD-10-CM

## 2019-10-10 PROCEDURE — 99213 OFFICE O/P EST LOW 20 MIN: CPT | Performed by: PHYSICIAN ASSISTANT

## 2019-10-10 RX ORDER — POLYETHYLENE GLYCOL 3350 17 G/17G
17 POWDER, FOR SOLUTION ORAL
COMMUNITY
Start: 2017-09-20 | End: 2019-10-10

## 2019-10-10 RX ORDER — POLYETHYLENE GLYCOL 3350 17 G/17G
17 POWDER, FOR SOLUTION ORAL DAILY PRN
Qty: 850 G | Refills: 3 | Status: CANCELLED | OUTPATIENT
Start: 2019-10-10

## 2019-10-10 RX ORDER — POLYETHYLENE GLYCOL 3350 17 G/17G
POWDER, FOR SOLUTION ORAL
Qty: 1 BOTTLE | Refills: 3 | Status: SHIPPED | OUTPATIENT
Start: 2019-10-10

## 2019-10-10 NOTE — LETTER
" My COPD Action Plan     Name: Espinoza Willoughby    YOB: 1950   Date: 10/9/2019    My doctor: Andres Rubio PA-C   My clinic: [address] [address] [phone]  My Controller Medicine: { :508208}   Dose: ***     My Rescue Medicine: { :894931}   Dose: ***     My Flare Up Medicine: { :735118}   Dose: ***     My COPD Severity: { :653086}      Use of Oxygen: { :301995::\"Oxygen Not Prescribed \"}     Make sure you've had your pneumonia   vaccines.          GREEN ZONE       Doing well today      Usual level of activity and exercise    Usual amount of cough and mucus    No shortness of breath    Usual level of health (thinking clearly, sleeping well, feel like eating) Actions:      Take daily medicines    Use oxygen as prescribed    Follow regular exercise and diet plan    Avoid cigarette smoke and other irritants that harm the lungs           YELLOW ZONE          Having a bad day or flare up      Short of breath more than usual    A lot more sputum (mucus) than usual    Sputum looks yellow, green, tan, brown or bloody    More coughing or wheezing    Fever or chills    Less energy; trouble completing activities    Trouble thinking or focusing    Using quick relief inhaler or nebulizer more often    Poor sleep; symptoms wake me up    Do not feel like eating Actions:      Get plenty of rest    Take daily medicines    Use quick relief inhaler every *** hours    If you use oxygen, call you doctor to see if you should adjust your oxygen    Do breathing exercises or other things to help you relax    Let a loved one, friend or neighbor know you are feeling worse    Call your care team if you have 2 or more symptoms.  Start taking steroids or antibiotics if directed by your care team           RED ZONE       Need medical care now      Severe shortness of breath (feel you can't breathe)    Fever, chills    Not enough breath to do any activity    Trouble " coughing up mucus, walking or talking    Blood in mucus    Frequent coughing   Rescue medicines are not working    Not able to sleep because of breathing    Feel confused or drowsy    Chest pain    Actions:      Call your health care team.  If you cannot reach your care team, call 911 or go to the emergency room.        Annual Reminders:  Meet with Care Team, Flu Shot every Fall  Pharmacy: [facility]. - [place], [place] - [address]

## 2019-10-10 NOTE — PATIENT INSTRUCTIONS
Espinoza Willoughby is feeling well today and back to his normal self.     Will add miralax as needed for constipation.     A letter will be sent to his pharmacy with medication changes.     Andres Rubio PA-C

## 2019-10-10 NOTE — TELEPHONE ENCOUNTER
Reason for Call:  Other prescription    Detailed comments: pharmacy is calling to check on dosage amount for miralax. Please call to discuss  Thank you     Phone Number Patient can be reached at: Other phone number:  [phone]    Best Time:     Can we leave a detailed message on this number? YES    Call taken on 10/10/2019 at 2:20 PM by Sandra Blackwell

## 2019-10-10 NOTE — LETTER
[facility]  [address] [facility] [address]  Phone: [phone]    October 10, 2019        Espinoza Willoughby  [address] [facility] [address]          To whom it may concern:    RE: Espinoza Willoughby    Patient was seen and treated today at our clinic. Espinoza Willoughby is no longer needing Nicotine Bhanu 4 mg mint tablets or lactulose, or Flovent.     Please contact me for questions or concerns.      Sincerely,        Andres Rubio PA-C

## 2019-10-10 NOTE — TELEPHONE ENCOUNTER
Medication from pt hx pended.  Please sign if agree with clarification or change it.  Thank you  Kimberly SUEN, RN

## 2019-10-11 ENCOUNTER — TELEPHONE (OUTPATIENT)
Dept: FAMILY MEDICINE | Facility: CLINIC | Age: 69
End: 2019-10-11

## 2019-10-11 DIAGNOSIS — R29.6 FREQUENT FALLS: Primary | ICD-10-CM

## 2019-10-11 NOTE — TELEPHONE ENCOUNTER
Per chart review, pt was recently seen in clinic on 10/10/19 by Andres Rubio PA-C.  Routed to provider to review and advise if need for wheelchair was discussed at office visit yesterday, or if the need was apparent at the office visit.    Joleen Fernandez, BSN, RN

## 2019-10-11 NOTE — TELEPHONE ENCOUNTER
Reason for call:  Other   Patient called regarding (reason for call): call back  Additional comments: needs an referral for an wheelchair    Phone number to reach patient:  Home number on file [phone] (home)    Best Time:  anytime    Can we leave a detailed message on this number?  YES

## 2019-10-11 NOTE — TELEPHONE ENCOUNTER
Previous rx was written at patient and caregivers request, so it is correct.    Andres Rubio PA-C

## 2019-10-11 NOTE — TELEPHONE ENCOUNTER
This was not discussed at his apt.   I question the need for one. Please gather more information please    Andres Rubio PA-C

## 2019-10-11 NOTE — TELEPHONE ENCOUNTER
Jami, .  She called to request wheelchair.   Per Jami, patient is unsteady walking, would normally recommend a walker, however patient does not like walkers (which he reports).  New to this group home, but historically has had a wheelchair, but broke in [place], at previous group home.  Currently not using anything.  Jami, reports, patient has Leukemia and had an injury to his ankle in the past and he is weak.    Please advise  Fiona Akins RN

## 2019-10-14 ENCOUNTER — TRANSFERRED RECORDS (OUTPATIENT)
Dept: HEALTH INFORMATION MANAGEMENT | Facility: CLINIC | Age: 69
End: 2019-10-14

## 2019-10-14 ENCOUNTER — TELEPHONE (OUTPATIENT)
Dept: ONCOLOGY | Facility: CLINIC | Age: 69
End: 2019-10-14

## 2019-10-14 NOTE — TELEPHONE ENCOUNTER
Left message requesting call back from Jami or another staff member to confirm pt is receiving care for his leukemia, likely from [facility].  Stated just wanted to confirm (re: no show for appt in Sept).

## 2019-10-15 NOTE — TELEPHONE ENCOUNTER
Andres Rubio PA-C received form from [employer] with questions pertaining to the w/c usage. Left message for Jami at group home to call me back for more information on patient's mobility/ w/c needs. Kirstin MCLEAN, [phone]

## 2019-10-16 ENCOUNTER — MEDICAL CORRESPONDENCE (OUTPATIENT)
Dept: HEALTH INFORMATION MANAGEMENT | Facility: CLINIC | Age: 69
End: 2019-10-16

## 2019-10-16 NOTE — TELEPHONE ENCOUNTER
I spoke with Jami at the group home.  She states that Espinoza currently is not using a wheelchair in this home (just transferred in to it).  Had had one prior but when called to obtain it they told her it had broken.  Patient refuses to use a cane or a walker.  Jami states he feels they're not stable so won't use.  He does require help with dressing and bathing.  Staff (assist of 1) helps him into/out of the shower due to his unsteady gait.  He is able to feed self.  Does own grooming.  The house is not wheelchair accessible.  Patient's bedroom and the bathroom are upstairs.  He is able to walk up/down stairs with 1 staff assist and a transfer belt.  He ambulates short distances within the home; uses walls, furniture for balance.  Staff estimates about 20 feet and then sits down.  They are primarily requesting a wheelchair for increased mobility needs outside of the home.  Because he only walks short distances at a time he needs to stop and rest/sit down frequently.  The house staff is able to assist him with the wheelchair.  Does not need a special wheelchair due to height/weight.  Does not have lower extremity edema; does not need to elevate his legs.    Andres Rubio PA-C signed the form.  :;  Please fax.  Kirstin Ngo RN

## 2019-10-18 ENCOUNTER — TRANSFERRED RECORDS (OUTPATIENT)
Dept: HEALTH INFORMATION MANAGEMENT | Facility: CLINIC | Age: 69
End: 2019-10-18

## 2019-10-24 ENCOUNTER — DOCUMENTATION ONLY (OUTPATIENT)
Dept: ONCOLOGY | Facility: CLINIC | Age: 69
End: 2019-10-24

## 2019-10-24 NOTE — PROGRESS NOTES
Noted under Media tab, progress notes from Dr. Sarika Ng at [facility] indicating providing care for pt.

## 2019-10-25 ENCOUNTER — OFFICE VISIT (OUTPATIENT)
Dept: FAMILY MEDICINE | Facility: CLINIC | Age: 69
End: 2019-10-25
Payer: MEDICARE

## 2019-10-25 ENCOUNTER — TELEPHONE (OUTPATIENT)
Dept: FAMILY MEDICINE | Facility: CLINIC | Age: 69
End: 2019-10-25

## 2019-10-25 VITALS
HEIGHT: 68 IN | SYSTOLIC BLOOD PRESSURE: 139 MMHG | BODY MASS INDEX: 27.71 KG/M2 | HEART RATE: 89 BPM | DIASTOLIC BLOOD PRESSURE: 72 MMHG | TEMPERATURE: 97.6 F | WEIGHT: 182.8 LBS

## 2019-10-25 DIAGNOSIS — F17.200 SMOKER: ICD-10-CM

## 2019-10-25 DIAGNOSIS — Z91.81 RISK FOR FALLS: ICD-10-CM

## 2019-10-25 DIAGNOSIS — S09.90XD INJURY OF HEAD, SUBSEQUENT ENCOUNTER: Primary | ICD-10-CM

## 2019-10-25 PROCEDURE — 99213 OFFICE O/P EST LOW 20 MIN: CPT | Performed by: NURSE PRACTITIONER

## 2019-10-25 RX ORDER — SENNOSIDES A AND B 8.6 MG/1
8.6-17.2 TABLET, FILM COATED ORAL DAILY PRN
COMMUNITY
Start: 2019-07-31

## 2019-10-25 RX ORDER — NICOTINE POLACRILEX 4 MG/1
20 GUM, CHEWING ORAL 2 TIMES DAILY
COMMUNITY

## 2019-10-25 RX ORDER — ACETAMINOPHEN 500 MG
1000 TABLET ORAL
COMMUNITY

## 2019-10-25 RX ORDER — IMATINIB MESYLATE 400 MG/1
400 TABLET, FILM COATED ORAL
COMMUNITY
End: 2020-01-01

## 2019-10-25 RX ORDER — LACTULOSE 10 G/15ML
20 SOLUTION ORAL
COMMUNITY
Start: 2019-07-31 | End: 2019-12-04

## 2019-10-25 RX ORDER — ALBUTEROL SULFATE 90 UG/1
2 AEROSOL, METERED RESPIRATORY (INHALATION)
COMMUNITY
Start: 2017-08-30 | End: 2019-12-04

## 2019-10-25 RX ORDER — PROPRANOLOL HYDROCHLORIDE 10 MG/1
10 TABLET ORAL
COMMUNITY
Start: 2019-07-31 | End: 2020-01-01 | Stop reason: ALTCHOICE

## 2019-10-25 RX ORDER — POLYETHYLENE GLYCOL 3350 17 G
2 POWDER IN PACKET (EA) ORAL
Qty: 169 LOZENGE | Refills: 1 | Status: SHIPPED | OUTPATIENT
Start: 2019-10-25 | End: 2020-01-01

## 2019-10-25 ASSESSMENT — ENCOUNTER SYMPTOMS
FATIGUE: 0
ABDOMINAL PAIN: 0
CHILLS: 0
FEVER: 0
ACTIVITY CHANGE: 0
COUGH: 0
SORE THROAT: 0
DIZZINESS: 0
APPETITE CHANGE: 0
HEADACHES: 0
SHORTNESS OF BREATH: 0
TROUBLE SWALLOWING: 0

## 2019-10-25 ASSESSMENT — MIFFLIN-ST. JEOR: SCORE: 1560.74

## 2019-10-25 NOTE — PATIENT INSTRUCTIONS
Follow-up with Psychiatry for sleeping issues  Call office if agreeable to Physical therapy referral  Nicotine lozenges have already been refilled earlier today by CELESTE Hightower.

## 2019-10-25 NOTE — TELEPHONE ENCOUNTER
RN returned call to [facility] to discuss nicotine lozenge request.   Pharmacy staff states call was received from group home staff requesting a refill of pt's nicotine 4mg lozenges, but pharmacy staff states this order was d/c'd per 10/10/19 letter instructions.  Pharmacy staff states that the 9/11/19 order for 2mg nicotine lozenges still has a refill available, and she states she will call group home to clarify if pt is interested in the 2mg strength.    Routed to provider to review and advise. Nicotine 4mg lozenges d/c'd on 10/10/19.   Nicotine 2mg lozenges were not d/c'd and refill is available on 9/11/19 order. Please confirm if 2mg strength order should still be active and available to pt as reflected on med list and pharmacy.    nicotine (COMMIT) 2 MG lozenge 169 lozenge 1 9/11/2019  No   Sig: Take 1 lozenge by mouth as needed, maximum of 10 lozenges per day.     LINETTE SampsonN, RN
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
## 2019-10-30 ENCOUNTER — TELEPHONE (OUTPATIENT)
Dept: FAMILY MEDICINE | Facility: CLINIC | Age: 69
End: 2019-10-30

## 2019-10-30 DIAGNOSIS — F17.200 SMOKER: ICD-10-CM

## 2019-10-30 RX ORDER — NICOTINE 21 MG/24HR
1 PATCH, TRANSDERMAL 24 HOURS TRANSDERMAL EVERY 24 HOURS
Qty: 30 PATCH | Refills: 0 | Status: SHIPPED | OUTPATIENT
Start: 2019-10-30 | End: 2019-11-19

## 2019-10-30 NOTE — TELEPHONE ENCOUNTER
Jami states patient is requesting he go back on the Nicotine patch 21 mg as the 14 mg is not working.    Thank you.

## 2019-10-30 NOTE — TELEPHONE ENCOUNTER
See telephone encounter 10/25/19, request was for Nicotine Lozenges.    Per Jami, , patient is currently using Nicotine patch 14 mg, however patient does not feel this is effective.  Requesting refill of the Nicotine Patch 21 mg.  Patient is also using the Nicotine Lozenges 2 mg in addition to the Nicotine patch.  Currently using 1-4 Lozenges a day.    Please advise, medication is pended.    Fiona Akins RN

## 2019-11-18 ENCOUNTER — TELEPHONE (OUTPATIENT)
Dept: FAMILY MEDICINE | Facility: CLINIC | Age: 69
End: 2019-11-18

## 2019-11-18 DIAGNOSIS — F17.200 SMOKER: ICD-10-CM

## 2019-11-18 NOTE — TELEPHONE ENCOUNTER
Spoke to Jami, patient is currently on Nicotine 7 mg patches, patient is smoking about 5 cigarettes a day in addition to 3 nicotine lozenges a day.  When patient was on Nicotine 14 mg patches would smoke 1-2 cigs a day.  Patient did not smoke on Nicotine 21 mg patches.      Please advise  Fiona Akins RN

## 2019-11-18 NOTE — TELEPHONE ENCOUNTER
Reason for Call:  Other call back    Detailed comments: REM-is calling stating patient in currently on nicotine patches but started smoking again, would like a call back for directions. Please call to discuss. Thank  You.    Phone Number Patient can be reached at: [phone]    Best Time:     Can we leave a detailed message on this number? YES    Call taken on 11/18/2019 at 10:52 AM by Bhavna Rush

## 2019-11-19 RX ORDER — NICOTINE 21 MG/24HR
1 PATCH, TRANSDERMAL 24 HOURS TRANSDERMAL EVERY 24 HOURS
Qty: 30 PATCH | Refills: 3 | Status: SHIPPED | OUTPATIENT
Start: 2019-11-19 | End: 2020-01-01

## 2019-11-19 NOTE — TELEPHONE ENCOUNTER
Left message on [employer] for Jami that an prescription for the requested 21mg nicotine patch was sent to [facility] for Espinoza Willoughby .  Kirstin Ngo RN

## 2019-11-24 ENCOUNTER — TRANSFERRED RECORDS (OUTPATIENT)
Dept: HEALTH INFORMATION MANAGEMENT | Facility: CLINIC | Age: 69
End: 2019-11-24

## 2019-12-02 ENCOUNTER — TRANSFERRED RECORDS (OUTPATIENT)
Dept: HEALTH INFORMATION MANAGEMENT | Facility: CLINIC | Age: 69
End: 2019-12-02

## 2019-12-04 ENCOUNTER — TELEPHONE (OUTPATIENT)
Dept: FAMILY MEDICINE | Facility: CLINIC | Age: 69
End: 2019-12-04

## 2019-12-04 ENCOUNTER — OFFICE VISIT (OUTPATIENT)
Dept: FAMILY MEDICINE | Facility: CLINIC | Age: 69
End: 2019-12-04
Payer: MEDICARE

## 2019-12-04 ENCOUNTER — ANCILLARY PROCEDURE (OUTPATIENT)
Dept: GENERAL RADIOLOGY | Facility: CLINIC | Age: 69
End: 2019-12-04
Attending: PHYSICIAN ASSISTANT
Payer: MEDICARE

## 2019-12-04 VITALS
HEART RATE: 86 BPM | DIASTOLIC BLOOD PRESSURE: 62 MMHG | WEIGHT: 186 LBS | SYSTOLIC BLOOD PRESSURE: 108 MMHG | BODY MASS INDEX: 28.7 KG/M2

## 2019-12-04 DIAGNOSIS — J44.9 CHRONIC OBSTRUCTIVE PULMONARY DISEASE, UNSPECIFIED COPD TYPE (H): ICD-10-CM

## 2019-12-04 DIAGNOSIS — R91.8 PULMONARY NODULES: ICD-10-CM

## 2019-12-04 DIAGNOSIS — M25.571 RIGHT ANKLE PAIN, UNSPECIFIED CHRONICITY: ICD-10-CM

## 2019-12-04 DIAGNOSIS — J40 BRONCHITIS: ICD-10-CM

## 2019-12-04 DIAGNOSIS — M25.571 RIGHT ANKLE PAIN, UNSPECIFIED CHRONICITY: Primary | ICD-10-CM

## 2019-12-04 PROCEDURE — 99214 OFFICE O/P EST MOD 30 MIN: CPT | Performed by: PHYSICIAN ASSISTANT

## 2019-12-04 PROCEDURE — 73610 X-RAY EXAM OF ANKLE: CPT | Mod: RT

## 2019-12-04 PROCEDURE — 73630 X-RAY EXAM OF FOOT: CPT | Mod: RT

## 2019-12-04 RX ORDER — BENZONATATE 100 MG/1
100 CAPSULE ORAL 3 TIMES DAILY PRN
Qty: 30 CAPSULE | Refills: 0 | Status: SHIPPED | OUTPATIENT
Start: 2019-12-04 | End: 2020-01-01

## 2019-12-04 RX ORDER — ALBUTEROL SULFATE 1.25 MG/3ML
SOLUTION RESPIRATORY (INHALATION)
Qty: 300 ML | Refills: 11 | Status: SHIPPED | OUTPATIENT
Start: 2019-12-04 | End: 2020-01-01

## 2019-12-04 RX ORDER — ALBUTEROL SULFATE 90 UG/1
2 AEROSOL, METERED RESPIRATORY (INHALATION) EVERY 6 HOURS PRN
Qty: 1 INHALER | Refills: 3 | Status: SHIPPED | OUTPATIENT
Start: 2019-12-04

## 2019-12-04 RX ORDER — ALBUTEROL SULFATE 0.83 MG/ML
2.5 SOLUTION RESPIRATORY (INHALATION) EVERY 6 HOURS PRN
Qty: 1 BOX | Refills: 3 | Status: SHIPPED | OUTPATIENT
Start: 2019-12-04 | End: 2020-01-01

## 2019-12-04 RX ORDER — IBUPROFEN 600 MG/1
600 TABLET, FILM COATED ORAL 3 TIMES DAILY PRN
Qty: 60 TABLET | Refills: 0 | Status: SHIPPED | OUTPATIENT
Start: 2019-12-04 | End: 2020-01-01
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
## 2019-12-05 NOTE — TELEPHONE ENCOUNTER
Reason for Call:  Other appointment and call back    Detailed comments: calling about the brace.  Pt would like to know the cost. Please call pt back and discuss.   Caller informed that calls received after 3pm may not be returned same day.  Thank you      Phone Number Patient can be reached at: Home number on file [phone] (home)    Best Time: any    Can we leave a detailed message on this number? YES    Call taken on 12/4/2019 at 6:16 PM by Carolyn Wong

## 2019-12-11 ENCOUNTER — OFFICE VISIT (OUTPATIENT)
Dept: FAMILY MEDICINE | Facility: CLINIC | Age: 69
End: 2019-12-11
Payer: MEDICARE

## 2019-12-11 VITALS
SYSTOLIC BLOOD PRESSURE: 126 MMHG | BODY MASS INDEX: 26.83 KG/M2 | RESPIRATION RATE: 16 BRPM | HEART RATE: 81 BPM | DIASTOLIC BLOOD PRESSURE: 65 MMHG | TEMPERATURE: 97.9 F | WEIGHT: 177 LBS | HEIGHT: 68 IN | OXYGEN SATURATION: 98 %

## 2019-12-11 DIAGNOSIS — J40 BRONCHITIS: Primary | ICD-10-CM

## 2019-12-11 DIAGNOSIS — N39.0 URINARY TRACT INFECTION WITHOUT HEMATURIA, SITE UNSPECIFIED: ICD-10-CM

## 2019-12-11 LAB
ALBUMIN UR-MCNC: NEGATIVE MG/DL
APPEARANCE UR: CLEAR
BILIRUB UR QL STRIP: ABNORMAL
COLOR UR AUTO: YELLOW
GLUCOSE UR STRIP-MCNC: NEGATIVE MG/DL
HGB UR QL STRIP: NEGATIVE
KETONES UR STRIP-MCNC: NEGATIVE MG/DL
LEUKOCYTE ESTERASE UR QL STRIP: NEGATIVE
NITRATE UR QL: NEGATIVE
PH UR STRIP: 5.5 PH (ref 5–7)
SOURCE: ABNORMAL
SP GR UR STRIP: >1.03 (ref 1–1.03)
UROBILINOGEN UR STRIP-ACNC: 0.2 EU/DL (ref 0.2–1)

## 2019-12-11 PROCEDURE — 81003 URINALYSIS AUTO W/O SCOPE: CPT | Performed by: PHYSICIAN ASSISTANT

## 2019-12-11 PROCEDURE — 99213 OFFICE O/P EST LOW 20 MIN: CPT | Performed by: PHYSICIAN ASSISTANT

## 2019-12-11 RX ORDER — AZITHROMYCIN 250 MG/1
TABLET, FILM COATED ORAL
Qty: 6 TABLET | Refills: 0 | Status: SHIPPED | OUTPATIENT
Start: 2019-12-11 | End: 2020-01-01

## 2019-12-11 ASSESSMENT — MIFFLIN-ST. JEOR: SCORE: 1534.43

## 2019-12-11 NOTE — LETTER
" My COPD Action Plan     Name: Espinoza Willoughby    YOB: 1950   Date: 12/11/2019    My doctor: Andres Rubio PA-C   My clinic: [address] [address] [phone]  My Controller Medicine: { :128593}   Dose: ***     My Rescue Medicine: { :742179}   Dose: ***     My Flare Up Medicine: { :675395}   Dose: ***     My COPD Severity: { :388507}      Use of Oxygen: { :860102::\"Oxygen Not Prescribed \"}     Make sure you've had your pneumonia   vaccines.          GREEN ZONE       Doing well today      Usual level of activity and exercise    Usual amount of cough and mucus    No shortness of breath    Usual level of health (thinking clearly, sleeping well, feel like eating) Actions:      Take daily medicines    Use oxygen as prescribed    Follow regular exercise and diet plan    Avoid cigarette smoke and other irritants that harm the lungs           YELLOW ZONE          Having a bad day or flare up      Short of breath more than usual    A lot more sputum (mucus) than usual    Sputum looks yellow, green, tan, brown or bloody    More coughing or wheezing    Fever or chills    Less energy; trouble completing activities    Trouble thinking or focusing    Using quick relief inhaler or nebulizer more often    Poor sleep; symptoms wake me up    Do not feel like eating Actions:      Get plenty of rest    Take daily medicines    Use quick relief inhaler every *** hours    If you use oxygen, call you doctor to see if you should adjust your oxygen    Do breathing exercises or other things to help you relax    Let a loved one, friend or neighbor know you are feeling worse    Call your care team if you have 2 or more symptoms.  Start taking steroids or antibiotics if directed by your care team           RED ZONE       Need medical care now      Severe shortness of breath (feel you can't breathe)    Fever, chills    Not enough breath to do any activity    Trouble " coughing up mucus, walking or talking    Blood in mucus    Frequent coughing   Rescue medicines are not working    Not able to sleep because of breathing    Feel confused or drowsy    Chest pain    Actions:      Call your health care team.  If you cannot reach your care team, call 911 or go to the emergency room.        Annual Reminders:  Meet with Care Team, Flu Shot every Fall  Pharmacy: [facility]. - [place], [place] - [address]

## 2019-12-11 NOTE — PROGRESS NOTES
Subjective     Espinoza Willoughby is a 69 year old male who has a history of schizophrenia, COPD, traumatic brain injury, CML who presents to clinic today for the following health issues:  Here with caregiver  HPI       Hospital Follow-up Visit:    Hospital/Nursing Home/IP Rehab Facility: [facility]  Date of Admission: 11/24/19  Date of Discharge: 11/27/19  Reason(s) for Admission: Shortness of breath, copd, chest pain            Problems taking medications regularly:  None       Medication changes since discharge: None       Problems adhering to non-medication therapy:  None    Summary of hospitalization:  CareEverywhere information obtained and reviewed  Diagnostic Tests/Treatments reviewed.  Follow up needed: none  Other Healthcare Providers Involved in Patient s Care:         group home  Update since discharge: improved. But con't harsh cough and wheezing.    Post Discharge Medication Reconciliation: discharge medications reconciled and changed, per note/orders (see AVS).  Plan of care communicated with patient and caregiver     Coding guidelines for this visit:  Type of Medical   Decision Making Face-to-Face Visit       within 7 Days of discharge Face-to-Face Visit        within 14 days of discharge   Moderate Complexity [id number]   High Complexity [id number]          Right ankle pain follow up - was seen in clinic last week - still painful. Has been using brace as prescribed. Was to follow up  With podiatry but hasn't yet.       Patient Active Problem List   Diagnosis     Paranoid schizophrenia (H)     Monocular diplopia     Seizure disorder (H)     Hepatitis C virus carrier state (H)     Other constipation     Hyperammonemia (H)     Polysubstance dependence (H)     Substance-induced persisting dementia (H)     Paranoid personality (disorder) (H)     Grand mal seizure with intractable epilepsy (H)     Traumatic brain injury with loss of consciousness, sequela (H)     CML (chronic myelocytic leukemia) (H)      Smoker     Chronic obstructive pulmonary disease, unspecified COPD type (H)     Gastroesophageal reflux disease, esophagitis presence not specified     Frequent falls     Urinary incontinence, unspecified type     Hearing loss, unspecified hearing loss type, unspecified laterality     Impulse control disorder     Bilateral high frequency sensorineural hearing loss     Cervical disc displacement     Charcot's joint of foot, unspecified laterality     Chronic hepatitis C (H)     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    Social History     Tobacco Use     Smoking status: Former Smoker     Packs/day: 0.00     Last attempt to quit: 2019     Years since quittin.2     Smokeless tobacco: Never Used   Substance Use Topics     Alcohol use: Yes     No family history on file.      Current Outpatient Medications   Medication Sig Dispense Refill     acetaminophen (MAPAP) 325 MG tablet TAKE TWO  TABLETS (650MG) BY MOUTH EVERY SIX  HOURS AS NEEDED       acetaminophen (TYLENOL) 500 MG tablet Take 1,000 mg by mouth       albuterol (ACCUNEB) 1.25 MG/3ML neb solution NEBULIZE AND INHALE 1 VIAL EVERY 6 HOURS AS NEEDED FOR SHORTNESS OF BREATH /DYSPNEA OR WHEEZING (DX:J44.9) 300 mL 11     albuterol (PROAIR HFA/PROVENTIL HFA/VENTOLIN HFA) 108 (90 Base) MCG/ACT inhaler Inhale 2 puffs into the lungs every 6 hours as needed for shortness of breath / dyspnea or wheezing 1 Inhaler 3     albuterol (PROVENTIL) (2.5 MG/3ML) 0.083% neb solution Take 1 vial (2.5 mg) by nebulization every 6 hours as needed for shortness of breath / dyspnea or wheezing 1 Box 3      aspirin (ASPIRIN) 81 MG EC tablet Take 1 tablet (81 mg) by mouth daily 90 tablet 3     azithromycin (ZITHROMAX) 250 MG tablet Take 2 tablets (500 mg) by mouth daily for 1 day, THEN 1 tablet (250 mg) daily for 4 days. 6 tablet 0     benzonatate (TESSALON) 100 MG capsule Take 1 capsule (100 mg) by mouth 3 times daily as needed for cough 30 capsule 0     budesonide-formoterol (SYMBICORT) 80-4.5 MCG/ACT Inhaler Inhale 2 puffs into the lungs daily 3 Inhaler 1     clonazePAM (KLONOPIN) 0.5 MG tablet Take 0.5 mg by mouth 2 times daily        famotidine (PEPCID) 20 MG tablet Take 1 tablet (20 mg) by mouth 2 times daily 180 tablet 1     haloperidol (HALDOL) 10 MG tablet 10 mg At Bedtime        haloperidol decanoate (HALDOL DECANOATE) 100 MG/ML injection Inject 100 mg into the muscle every 30 days        ibuprofen (ADVIL/MOTRIN) 600 MG tablet Take 1 tablet (600 mg) by mouth 3 times daily as needed for moderate pain 60 tablet 0     imatinib (GLEEVEC) 400 MG tablet Take 400 mg by mouth       nicotine (COMMIT) 2 MG lozenge Place 1 lozenge (2 mg) inside cheek every hour as needed for smoking cessation Take 1 lozenge by mouth as needed, maximum of 10 lozenges per day. 169 lozenge 1     nicotine (NICODERM CQ) 21 MG/24HR 24 hr patch Place 1 patch onto the skin every 24 hours 30 patch 3     omeprazole 20 MG tablet Take 20 mg by mouth       order for DME Equipment being ordered: Nebulizer 1 Device 0     order for DME Equipment being ordered: Wheelchair 1 Device 1     order for DME Equipment being ordered: Adult Briefs 3 Box 11     order for DME Equipment being ordered: [place] belt 1 Device 1     OXcarbazepine (TRILEPTAL) 600 MG tablet Take 600 mg by mouth 2 times daily        phenytoin (DILANTIN) 100 MG capsule Take 100 mg by mouth 2 times daily        polyethylene glycol (MIRALAX/GLYCOLAX) powder Take 1-2 tsp daily as needed 1 Bottle 3     propranolol (INDERAL) 10 MG tablet Take 10 mg by mouth       QUEtiapine (SEROQUEL) 50 MG tablet  "Take 50 mg by mouth 4 times daily        QUEtiapine ER (SEROQUEL XR) 300 MG 24 hr tablet Take 300 mg by mouth At Bedtime 2 caps at bedtime       senna (SENNA-TIME) 8.6 MG tablet Take 8.6-17.2 mg by mouth       venlafaxine (EFFEXOR-XR) 75 MG 24 hr capsule Take 75 mg by mouth daily 3 caps every am       Allergies   Allergen Reactions     Benadryl [Diphenhydramine]      Bupropion      Seizure     Xanax [Alprazolam]      BP Readings from Last 3 Encounters:   12/11/19 126/65   12/04/19 108/62   10/25/19 139/72    Wt Readings from Last 3 Encounters:   12/11/19 80.3 kg (177 lb)   12/04/19 84.4 kg (186 lb)   10/25/19 82.9 kg (182 lb 12.8 oz)           Reviewed and updated as needed this visit by Provider         Review of Systems   ROS COMP: Constitutional, HEENT, cardiovascular, pulmonary, gi and gu systems are negative, except as otherwise noted.      Objective    /65   Pulse 81   Temp 97.9  F (36.6  C) (Oral)   Resp 16   Ht 1.715 m (5' 7.5\")   Wt 80.3 kg (177 lb)   SpO2 98%   BMI 27.31 kg/m    Body mass index is 27.31 kg/m .  Physical Exam   GENERAL: healthy, alert and no distress  Head: Normocephalic, atraumatic.  Eyes: Conjunctiva clear, non icteric. PERRLA.  Ears: External ears and TMs normal BL.  Nose: Septum midline, nasal mucosa pink and moist. No discharge.  Mouth / Throat: Normal dentition.  No oral lesions. Pharynx non erythematous, tonsils without hypertrophy.  Neck: Supple, no enlarged LN, trachea midline.   RESP: diffuse rhonchi and wheezes   CV: regular rate and rhythm, normal S1 S2, no S3 or S4, no murmur, click or rub, no peripheral edema and peripheral pulses strong  MS: no gross musculoskeletal defects noted, no edema      Diagnostic Test Results:  Labs reviewed in Epic        Assessment & Plan       ICD-10-CM    1. Bronchitis J40 azithromycin (ZITHROMAX) 250 MG tablet   2. Urinary tract infection without hematuria, site unspecified N39.0 *UA reflex to Microscopic and Culture ([place] and " [facility] (except [place] and [place])   start zpak.  Recheck 4 wks as needed.     Return in about 1 month (around 1/11/2020) for As Needed.    Andres Rubio PA-C  [facility]

## 2019-12-11 NOTE — PATIENT INSTRUCTIONS
Start Zpak antibiotics. Take as directed.   Continue inhalers as prescribed. Ok to increase use of albuterol to every 6 hr as needed.    Ok to follow up  With podiatry as discussed at last visit for ankle/foot pain.       Recheck 4 wks as needed.     Andres Rubio PA-C

## 2019-12-17 ENCOUNTER — ANCILLARY PROCEDURE (OUTPATIENT)
Dept: CT IMAGING | Facility: CLINIC | Age: 69
End: 2019-12-17
Attending: PHYSICIAN ASSISTANT
Payer: MEDICARE

## 2019-12-17 DIAGNOSIS — R91.8 PULMONARY NODULES: ICD-10-CM

## 2019-12-17 PROCEDURE — 71260 CT THORAX DX C+: CPT | Mod: TC

## 2019-12-17 RX ORDER — IOPAMIDOL 755 MG/ML
80 INJECTION, SOLUTION INTRAVASCULAR ONCE
Status: COMPLETED | OUTPATIENT
Start: 2019-12-17 | End: 2019-12-17

## 2019-12-17 RX ADMIN — IOPAMIDOL 80 ML: 755 INJECTION, SOLUTION INTRAVASCULAR at 12:45

## 2019-12-18 ENCOUNTER — TELEPHONE (OUTPATIENT)
Dept: FAMILY MEDICINE | Facility: CLINIC | Age: 69
End: 2019-12-18

## 2019-12-18 DIAGNOSIS — R91.1 LUNG NODULE: Primary | ICD-10-CM

## 2019-12-18 NOTE — TELEPHONE ENCOUNTER
Please complete pended order for lung nodule clinic and send back to team so RN can notify patient.  Kirstin Ngo RN

## 2019-12-19 ENCOUNTER — TELEPHONE (OUTPATIENT)
Dept: ONCOLOGY | Facility: CLINIC | Age: 69
End: 2019-12-19

## 2019-12-19 NOTE — TELEPHONE ENCOUNTER
Called and spoke with Tesfaye and he said the patient was sleep and took our number and said he the patient would call us back.

## 2019-12-23 NOTE — RESULT ENCOUNTER NOTE
Mailed the CT scan 12/17/2019 and the Lung Nodule Clinic referral to the patient's home address.  Radha MONTOYA

## 2020-01-01 ENCOUNTER — OFFICE VISIT (OUTPATIENT)
Dept: PULMONOLOGY | Facility: CLINIC | Age: 70
End: 2020-01-01
Payer: MEDICARE

## 2020-01-01 ENCOUNTER — NURSE TRIAGE (OUTPATIENT)
Dept: NURSING | Facility: CLINIC | Age: 70
End: 2020-01-01

## 2020-01-01 ENCOUNTER — PATIENT OUTREACH (OUTPATIENT)
Dept: CARE COORDINATION | Facility: CLINIC | Age: 70
End: 2020-01-01

## 2020-01-01 ENCOUNTER — VIRTUAL VISIT (OUTPATIENT)
Dept: PULMONOLOGY | Facility: CLINIC | Age: 70
End: 2020-01-01
Attending: PHYSICIAN ASSISTANT
Payer: MEDICARE

## 2020-01-01 ENCOUNTER — TELEPHONE (OUTPATIENT)
Dept: FAMILY MEDICINE | Facility: CLINIC | Age: 70
End: 2020-01-01

## 2020-01-01 ENCOUNTER — OFFICE VISIT (OUTPATIENT)
Dept: PODIATRY | Facility: CLINIC | Age: 70
End: 2020-01-01
Payer: MEDICARE

## 2020-01-01 ENCOUNTER — VIRTUAL VISIT (OUTPATIENT)
Dept: FAMILY MEDICINE | Facility: OTHER | Age: 70
End: 2020-01-01

## 2020-01-01 ENCOUNTER — MEDICAL CORRESPONDENCE (OUTPATIENT)
Dept: HEALTH INFORMATION MANAGEMENT | Facility: CLINIC | Age: 70
End: 2020-01-01

## 2020-01-01 ENCOUNTER — VIRTUAL VISIT (OUTPATIENT)
Dept: FAMILY MEDICINE | Facility: CLINIC | Age: 70
End: 2020-01-01
Payer: MEDICARE

## 2020-01-01 ENCOUNTER — PATIENT OUTREACH (OUTPATIENT)
Dept: PULMONOLOGY | Facility: CLINIC | Age: 70
End: 2020-01-01

## 2020-01-01 ENCOUNTER — PRE VISIT (OUTPATIENT)
Dept: PULMONOLOGY | Facility: CLINIC | Age: 70
End: 2020-01-01

## 2020-01-01 ENCOUNTER — TELEPHONE (OUTPATIENT)
Dept: PULMONOLOGY | Facility: CLINIC | Age: 70
End: 2020-01-01

## 2020-01-01 ENCOUNTER — ANCILLARY PROCEDURE (OUTPATIENT)
Dept: CT IMAGING | Facility: CLINIC | Age: 70
End: 2020-01-01
Attending: INTERNAL MEDICINE
Payer: MEDICARE

## 2020-01-01 ENCOUNTER — TRANSFERRED RECORDS (OUTPATIENT)
Dept: HEALTH INFORMATION MANAGEMENT | Facility: CLINIC | Age: 70
End: 2020-01-01

## 2020-01-01 ENCOUNTER — OFFICE VISIT (OUTPATIENT)
Dept: FAMILY MEDICINE | Facility: CLINIC | Age: 70
End: 2020-01-01
Payer: MEDICARE

## 2020-01-01 ENCOUNTER — NURSE TRIAGE (OUTPATIENT)
Dept: INTERNAL MEDICINE | Facility: CLINIC | Age: 70
End: 2020-01-01

## 2020-01-01 VITALS
SYSTOLIC BLOOD PRESSURE: 119 MMHG | BODY MASS INDEX: 28.7 KG/M2 | HEART RATE: 90 BPM | WEIGHT: 186 LBS | DIASTOLIC BLOOD PRESSURE: 62 MMHG

## 2020-01-01 VITALS
RESPIRATION RATE: 18 BRPM | SYSTOLIC BLOOD PRESSURE: 144 MMHG | HEIGHT: 68 IN | WEIGHT: 187 LBS | BODY MASS INDEX: 28.34 KG/M2 | HEART RATE: 71 BPM | OXYGEN SATURATION: 96 % | DIASTOLIC BLOOD PRESSURE: 74 MMHG

## 2020-01-01 VITALS
OXYGEN SATURATION: 97 % | HEIGHT: 68 IN | RESPIRATION RATE: 18 BRPM | SYSTOLIC BLOOD PRESSURE: 117 MMHG | HEART RATE: 83 BPM | TEMPERATURE: 97.6 F | WEIGHT: 190 LBS | BODY MASS INDEX: 28.79 KG/M2 | DIASTOLIC BLOOD PRESSURE: 65 MMHG

## 2020-01-01 VITALS
RESPIRATION RATE: 16 BRPM | WEIGHT: 196 LBS | SYSTOLIC BLOOD PRESSURE: 154 MMHG | TEMPERATURE: 97.3 F | HEIGHT: 68 IN | HEART RATE: 82 BPM | OXYGEN SATURATION: 97 % | DIASTOLIC BLOOD PRESSURE: 70 MMHG | BODY MASS INDEX: 29.7 KG/M2

## 2020-01-01 VITALS
BODY MASS INDEX: 30.61 KG/M2 | RESPIRATION RATE: 18 BRPM | SYSTOLIC BLOOD PRESSURE: 163 MMHG | HEIGHT: 67 IN | OXYGEN SATURATION: 98 % | DIASTOLIC BLOOD PRESSURE: 77 MMHG | HEART RATE: 74 BPM | WEIGHT: 195 LBS

## 2020-01-01 DIAGNOSIS — M25.571 RIGHT ANKLE PAIN, UNSPECIFIED CHRONICITY: ICD-10-CM

## 2020-01-01 DIAGNOSIS — F17.200 SMOKER: ICD-10-CM

## 2020-01-01 DIAGNOSIS — K21.9 GASTROESOPHAGEAL REFLUX DISEASE, ESOPHAGITIS PRESENCE NOT SPECIFIED: Primary | ICD-10-CM

## 2020-01-01 DIAGNOSIS — J44.9 CHRONIC OBSTRUCTIVE PULMONARY DISEASE, UNSPECIFIED COPD TYPE (H): ICD-10-CM

## 2020-01-01 DIAGNOSIS — I10 HYPERTENSION GOAL BP (BLOOD PRESSURE) < 140/90: ICD-10-CM

## 2020-01-01 DIAGNOSIS — Z11.59 SCREENING FOR VIRAL DISEASE: ICD-10-CM

## 2020-01-01 DIAGNOSIS — M79.605 PAIN IN BOTH LOWER EXTREMITIES: ICD-10-CM

## 2020-01-01 DIAGNOSIS — F17.200 SMOKER: Primary | ICD-10-CM

## 2020-01-01 DIAGNOSIS — R29.6 FREQUENT FALLS: Primary | ICD-10-CM

## 2020-01-01 DIAGNOSIS — I10 HYPERTENSION GOAL BP (BLOOD PRESSURE) < 140/90: Primary | ICD-10-CM

## 2020-01-01 DIAGNOSIS — F20.0 PARANOID SCHIZOPHRENIA (H): ICD-10-CM

## 2020-01-01 DIAGNOSIS — B18.2 CHRONIC HEPATITIS C WITHOUT HEPATIC COMA (H): ICD-10-CM

## 2020-01-01 DIAGNOSIS — M19.071 ARTHRITIS OF RIGHT FOOT: ICD-10-CM

## 2020-01-01 DIAGNOSIS — M79.605 PAIN IN BOTH LOWER EXTREMITIES: Primary | ICD-10-CM

## 2020-01-01 DIAGNOSIS — C92.10 CML (CHRONIC MYELOCYTIC LEUKEMIA) (H): ICD-10-CM

## 2020-01-01 DIAGNOSIS — M79.604 PAIN IN BOTH LOWER EXTREMITIES: ICD-10-CM

## 2020-01-01 DIAGNOSIS — Z91.81 AT RISK FOR FALLING: ICD-10-CM

## 2020-01-01 DIAGNOSIS — R91.8 PULMONARY NODULES: Primary | ICD-10-CM

## 2020-01-01 DIAGNOSIS — Z91.81 RISK FOR FALLS: ICD-10-CM

## 2020-01-01 DIAGNOSIS — J44.9 CHRONIC OBSTRUCTIVE PULMONARY DISEASE, UNSPECIFIED COPD TYPE (H): Primary | ICD-10-CM

## 2020-01-01 DIAGNOSIS — M20.42 HAMMER TOE OF LEFT FOOT: ICD-10-CM

## 2020-01-01 DIAGNOSIS — M79.604 PAIN IN BOTH LOWER EXTREMITIES: Primary | ICD-10-CM

## 2020-01-01 DIAGNOSIS — R91.8 PULMONARY NODULES: ICD-10-CM

## 2020-01-01 DIAGNOSIS — Z91.81 AT RISK FOR FALLING: Primary | ICD-10-CM

## 2020-01-01 DIAGNOSIS — J18.9 PNEUMONIA OF BOTH LOWER LOBES DUE TO INFECTIOUS ORGANISM: Primary | ICD-10-CM

## 2020-01-01 DIAGNOSIS — G40.909 SEIZURE DISORDER (H): Primary | ICD-10-CM

## 2020-01-01 DIAGNOSIS — M19.071 ARTHRITIS OF RIGHT ANKLE: Primary | ICD-10-CM

## 2020-01-01 DIAGNOSIS — D64.9 ANEMIA, UNSPECIFIED TYPE: ICD-10-CM

## 2020-01-01 DIAGNOSIS — K21.9 GASTROESOPHAGEAL REFLUX DISEASE, ESOPHAGITIS PRESENCE NOT SPECIFIED: ICD-10-CM

## 2020-01-01 LAB
ALBUMIN SERPL-MCNC: 3.3 G/DL (ref 3.4–5)
ALP SERPL-CCNC: 155 U/L (ref 40–150)
ALT SERPL W P-5'-P-CCNC: 14 U/L (ref 0–70)
ANION GAP SERPL CALCULATED.3IONS-SCNC: 4 MMOL/L (ref 3–14)
ANISOCYTOSIS BLD QL SMEAR: ABNORMAL
ANISOCYTOSIS BLD QL SMEAR: SLIGHT
AST SERPL W P-5'-P-CCNC: 18 U/L (ref 0–45)
BASOPHILS # BLD AUTO: 0.8 10E9/L (ref 0–0.2)
BASOPHILS # BLD AUTO: 1.4 10E9/L (ref 0–0.2)
BASOPHILS NFR BLD AUTO: 3 %
BASOPHILS NFR BLD AUTO: 5 %
BILIRUB SERPL-MCNC: 0.3 MG/DL (ref 0.2–1.3)
BUN SERPL-MCNC: 17 MG/DL (ref 7–30)
CALCIUM SERPL-MCNC: 8.4 MG/DL (ref 8.5–10.1)
CHLORIDE SERPL-SCNC: 110 MMOL/L (ref 94–109)
CO2 SERPL-SCNC: 28 MMOL/L (ref 20–32)
CREAT SERPL-MCNC: 0.84 MG/DL (ref 0.66–1.25)
DACRYOCYTES BLD QL SMEAR: SLIGHT
DIFFERENTIAL METHOD BLD: ABNORMAL
DIFFERENTIAL METHOD BLD: ABNORMAL
EOSINOPHIL # BLD AUTO: 0.3 10E9/L (ref 0–0.7)
EOSINOPHIL # BLD AUTO: 0.5 10E9/L (ref 0–0.7)
EOSINOPHIL NFR BLD AUTO: 1 %
EOSINOPHIL NFR BLD AUTO: 2 %
ERYTHROCYTE [DISTWIDTH] IN BLOOD BY AUTOMATED COUNT: 22.3 % (ref 10–15)
ERYTHROCYTE [DISTWIDTH] IN BLOOD BY AUTOMATED COUNT: 23.7 % (ref 10–15)
GFR SERPL CREATININE-BSD FRML MDRD: 89 ML/MIN/{1.73_M2}
GLUCOSE SERPL-MCNC: 114 MG/DL (ref 70–99)
HCT VFR BLD AUTO: 28.7 % (ref 40–53)
HCT VFR BLD AUTO: 30.1 % (ref 40–53)
HGB BLD-MCNC: 7.9 G/DL (ref 13.3–17.7)
HGB BLD-MCNC: 8.5 G/DL (ref 13.3–17.7)
LYMPHOCYTES # BLD AUTO: 2.2 10E9/L (ref 0.8–5.3)
LYMPHOCYTES # BLD AUTO: 2.8 10E9/L (ref 0.8–5.3)
LYMPHOCYTES NFR BLD AUTO: 11 %
LYMPHOCYTES NFR BLD AUTO: 8 %
MCH RBC QN AUTO: 22 PG (ref 26.5–33)
MCH RBC QN AUTO: 23.6 PG (ref 26.5–33)
MCHC RBC AUTO-ENTMCNC: 27.5 G/DL (ref 31.5–36.5)
MCHC RBC AUTO-ENTMCNC: 28.2 G/DL (ref 31.5–36.5)
MCV RBC AUTO: 80 FL (ref 78–100)
MCV RBC AUTO: 84 FL (ref 78–100)
METAMYELOCYTES # BLD: 1.1 10E9/L
METAMYELOCYTES # BLD: 1.8 10E9/L
METAMYELOCYTES NFR BLD MANUAL: 4 %
METAMYELOCYTES NFR BLD MANUAL: 7 %
MONOCYTES # BLD AUTO: 1.3 10E9/L (ref 0–1.3)
MONOCYTES # BLD AUTO: 1.4 10E9/L (ref 0–1.3)
MONOCYTES NFR BLD AUTO: 5 %
MONOCYTES NFR BLD AUTO: 5 %
MYELOCYTES # BLD: 1 10E9/L
MYELOCYTES # BLD: 2.4 10E9/L
MYELOCYTES NFR BLD MANUAL: 4 %
MYELOCYTES NFR BLD MANUAL: 9 %
NEUTROPHILS # BLD AUTO: 17.1 10E9/L (ref 1.6–8.3)
NEUTROPHILS # BLD AUTO: 17.6 10E9/L (ref 1.6–8.3)
NEUTROPHILS NFR BLD AUTO: 65 %
NEUTROPHILS NFR BLD AUTO: 69 %
NRBC # BLD AUTO: 0.5 10*3/UL
NRBC BLD AUTO-RTO: 2 /100
PLATELET # BLD AUTO: 260 10E9/L (ref 150–450)
PLATELET # BLD AUTO: 301 10E9/L (ref 150–450)
PLATELET # BLD EST: ABNORMAL 10*3/UL
PLATELET # BLD EST: ABNORMAL 10*3/UL
POTASSIUM SERPL-SCNC: 3.8 MMOL/L (ref 3.4–5.3)
POTASSIUM SERPL-SCNC: 3.8 MMOL/L (ref 3.5–5)
PROMYELOCYTES # BLD MANUAL: 0.5 10E9/L
PROMYELOCYTES NFR BLD MANUAL: 2 %
PROT SERPL-MCNC: 6.7 G/DL (ref 6.8–8.8)
RBC # BLD AUTO: 3.59 10E12/L (ref 4.4–5.9)
RBC # BLD AUTO: 3.6 10E12/L (ref 4.4–5.9)
SODIUM SERPL-SCNC: 142 MMOL/L (ref 133–144)
WBC # BLD AUTO: 25.1 10E9/L (ref 4–11)
WBC # BLD AUTO: 27 10E9/L (ref 4–11)

## 2020-01-01 PROCEDURE — 99214 OFFICE O/P EST MOD 30 MIN: CPT | Performed by: PHYSICIAN ASSISTANT

## 2020-01-01 PROCEDURE — 71250 CT THORAX DX C-: CPT | Performed by: RADIOLOGY

## 2020-01-01 PROCEDURE — 36415 COLL VENOUS BLD VENIPUNCTURE: CPT | Performed by: PHYSICIAN ASSISTANT

## 2020-01-01 PROCEDURE — 80053 COMPREHEN METABOLIC PANEL: CPT | Performed by: PHYSICIAN ASSISTANT

## 2020-01-01 PROCEDURE — 99441 PR PHYSICIAN TELEPHONE EVALUATION 5-10 MIN: CPT | Mod: 95 | Performed by: PHYSICIAN ASSISTANT

## 2020-01-01 PROCEDURE — 99204 OFFICE O/P NEW MOD 45 MIN: CPT | Mod: 25 | Performed by: INTERNAL MEDICINE

## 2020-01-01 PROCEDURE — 99203 OFFICE O/P NEW LOW 30 MIN: CPT | Performed by: PODIATRIST

## 2020-01-01 PROCEDURE — 99443 ZZC PHYSICIAN TELEPHONE EVALUATION 21-30 MIN: CPT | Performed by: INTERNAL MEDICINE

## 2020-01-01 PROCEDURE — 85025 COMPLETE CBC W/AUTO DIFF WBC: CPT | Performed by: PHYSICIAN ASSISTANT

## 2020-01-01 RX ORDER — BUDESONIDE AND FORMOTEROL FUMARATE DIHYDRATE 160; 4.5 UG/1; UG/1
2 AEROSOL RESPIRATORY (INHALATION) 2 TIMES DAILY
Qty: 1 INHALER | Refills: 3 | Status: SHIPPED | OUTPATIENT
Start: 2020-01-01 | End: 2020-01-01

## 2020-01-01 RX ORDER — LORATADINE 10 MG/1
10 TABLET ORAL DAILY
COMMUNITY
End: 2020-01-01

## 2020-01-01 RX ORDER — NICOTINE 21 MG/24HR
1 PATCH, TRANSDERMAL 24 HOURS TRANSDERMAL EVERY 24 HOURS
Qty: 30 PATCH | Refills: 11 | Status: SHIPPED | OUTPATIENT
Start: 2020-01-01

## 2020-01-01 RX ORDER — METOPROLOL SUCCINATE 50 MG/1
50 TABLET, EXTENDED RELEASE ORAL DAILY
Qty: 90 TABLET | Refills: 0 | Status: SHIPPED | OUTPATIENT
Start: 2020-01-01 | End: 2020-01-01 | Stop reason: DRUGHIGH

## 2020-01-01 RX ORDER — LISINOPRIL/HYDROCHLOROTHIAZIDE 10-12.5 MG
TABLET ORAL
Qty: 62 TABLET | Refills: 3 | Status: SHIPPED | OUTPATIENT
Start: 2020-01-01

## 2020-01-01 RX ORDER — TRIHEXYPHENIDYL HYDROCHLORIDE 2 MG/1
TABLET ORAL
COMMUNITY
Start: 2020-01-01

## 2020-01-01 RX ORDER — HYDROXYZINE HYDROCHLORIDE 50 MG/1
TABLET, FILM COATED ORAL
COMMUNITY
Start: 2020-01-01

## 2020-01-01 RX ORDER — LISINOPRIL/HYDROCHLOROTHIAZIDE 10-12.5 MG
TABLET ORAL
Qty: 62 TABLET | Refills: 1 | Status: SHIPPED | OUTPATIENT
Start: 2020-01-01 | End: 2020-01-01

## 2020-01-01 RX ORDER — BENZONATATE 100 MG/1
100 CAPSULE ORAL 3 TIMES DAILY PRN
COMMUNITY

## 2020-01-01 RX ORDER — LISINOPRIL 20 MG/1
20 TABLET ORAL DAILY
Qty: 90 TABLET | Refills: 0 | Status: SHIPPED | OUTPATIENT
Start: 2020-01-01 | End: 2020-01-01 | Stop reason: ALTCHOICE

## 2020-01-01 RX ORDER — METOPROLOL SUCCINATE 25 MG/1
25 TABLET, EXTENDED RELEASE ORAL DAILY
Qty: 90 TABLET | Refills: 0 | Status: SHIPPED | OUTPATIENT
Start: 2020-01-01 | End: 2020-01-01

## 2020-01-01 RX ORDER — NAPROXEN 500 MG/1
500 TABLET ORAL 2 TIMES DAILY WITH MEALS
Qty: 60 TABLET | Refills: 0 | Status: SHIPPED | OUTPATIENT
Start: 2020-01-01 | End: 2020-01-01

## 2020-01-01 RX ORDER — GABAPENTIN 300 MG/1
300 CAPSULE ORAL AT BEDTIME
COMMUNITY

## 2020-01-01 RX ORDER — NICOTINE 21 MG/24HR
1 PATCH, TRANSDERMAL 24 HOURS TRANSDERMAL EVERY 24 HOURS
Qty: 28 PATCH | Refills: 3 | Status: SHIPPED | OUTPATIENT
Start: 2020-01-01 | End: 2020-01-01

## 2020-01-01 RX ORDER — CIMETIDINE 800 MG
800 TABLET ORAL AT BEDTIME
Qty: 90 TABLET | Refills: 3 | Status: SHIPPED | OUTPATIENT
Start: 2020-01-01

## 2020-01-01 RX ORDER — IBUPROFEN 600 MG/1
TABLET, FILM COATED ORAL
Qty: 90 TABLET | Refills: 3 | Status: SHIPPED | OUTPATIENT
Start: 2020-01-01

## 2020-01-01 RX ORDER — LISINOPRIL 10 MG/1
10 TABLET ORAL DAILY
Qty: 90 TABLET | Refills: 0 | Status: SHIPPED | OUTPATIENT
Start: 2020-01-01 | End: 2020-01-01 | Stop reason: DRUGHIGH

## 2020-01-01 RX ORDER — LISINOPRIL/HYDROCHLOROTHIAZIDE 10-12.5 MG
1 TABLET ORAL DAILY
Qty: 90 TABLET | Refills: 0 | Status: SHIPPED | OUTPATIENT
Start: 2020-01-01 | End: 2020-01-01

## 2020-01-01 RX ORDER — POLYETHYLENE GLYCOL 3350 17 G
POWDER IN PACKET (EA) ORAL
Qty: 144 LOZENGE | Refills: 11 | Status: SHIPPED | OUTPATIENT
Start: 2020-01-01

## 2020-01-01 RX ORDER — OMEPRAZOLE 20 MG/1
20 TABLET, DELAYED RELEASE ORAL DAILY
Qty: 90 TABLET | Refills: 3 | Status: SHIPPED | OUTPATIENT
Start: 2020-01-01 | End: 2020-01-01

## 2020-01-01 RX ORDER — ANALGESIC BALM 1.74; 4.06 G/29G; G/29G
OINTMENT TOPICAL
Qty: 1 TUBE | COMMUNITY
Start: 2020-01-01

## 2020-01-01 RX ORDER — BUDESONIDE AND FORMOTEROL FUMARATE DIHYDRATE 160; 4.5 UG/1; UG/1
2 AEROSOL RESPIRATORY (INHALATION) 2 TIMES DAILY
Qty: 3 INHALER | Refills: 1 | Status: SHIPPED | OUTPATIENT
Start: 2020-01-01

## 2020-01-01 RX ORDER — LISINOPRIL/HYDROCHLOROTHIAZIDE 10-12.5 MG
1 TABLET ORAL 2 TIMES DAILY
Qty: 62 TABLET | Refills: 1 | Status: SHIPPED | OUTPATIENT
Start: 2020-01-01 | End: 2020-01-01

## 2020-01-01 RX ORDER — NAPROXEN 500 MG/1
TABLET ORAL
Refills: 11 | OUTPATIENT
Start: 2020-01-01

## 2020-01-01 RX ORDER — PROPRANOLOL HYDROCHLORIDE 40 MG/1
40 TABLET ORAL 2 TIMES DAILY
Qty: 60 TABLET | Refills: 0 | Status: SHIPPED | OUTPATIENT
Start: 2020-01-01 | End: 2020-01-01

## 2020-01-01 RX ORDER — FAMOTIDINE 20 MG/1
20 TABLET, FILM COATED ORAL 2 TIMES DAILY
Qty: 180 TABLET | Refills: 4 | Status: SHIPPED | OUTPATIENT
Start: 2020-01-01 | End: 2020-01-01

## 2020-01-01 RX ORDER — FERROUS SULFATE 325(65) MG
325 TABLET, DELAYED RELEASE (ENTERIC COATED) ORAL DAILY
COMMUNITY
End: 2020-01-01

## 2020-01-01 RX ORDER — LISINOPRIL/HYDROCHLOROTHIAZIDE 10-12.5 MG
1 TABLET ORAL 2 TIMES DAILY
Qty: 60 TABLET | Refills: 0 | Status: SHIPPED | OUTPATIENT
Start: 2020-01-01 | End: 2020-01-01

## 2020-01-01 RX ORDER — OXCARBAZEPINE 600 MG/1
TABLET, FILM COATED ORAL
Qty: 60 TABLET | Refills: 1 | OUTPATIENT
Start: 2020-01-01

## 2020-01-01 RX ORDER — ALBUTEROL SULFATE 1.25 MG/3ML
SOLUTION RESPIRATORY (INHALATION)
Qty: 375 ML | Refills: 1 | Status: SHIPPED | OUTPATIENT
Start: 2020-01-01

## 2020-01-01 RX ORDER — FERROUS SULFATE 325(65) MG
325 TABLET ORAL
Qty: 90 TABLET | Refills: 3 | Status: SHIPPED | OUTPATIENT
Start: 2020-01-01

## 2020-01-01 ASSESSMENT — MIFFLIN-ST. JEOR
SCORE: 1579.79
SCORE: 1620.61
SCORE: 1593.39
SCORE: 1608.14

## 2020-01-01 ASSESSMENT — PAIN SCALES - GENERAL: PAINLEVEL: NO PAIN (0)

## 2020-01-07 NOTE — TELEPHONE ENCOUNTER
[facility] requested medical records via fax. Faxed visit encounter dates of 9/11/2019 & 12/11/2019 to substantiate what they need for documentation for use of nebulizer and nebulizer inhalation medications. Faxed to [facility] Att: Jeancarlos Watkins @ [phone].  Radha MONTOYA

## 2020-01-15 NOTE — PROGRESS NOTES
Subjective:    Pt is seen today in consult from Andres Rubio to evaluate both of his feet.  Has a history of a right ankle fracture perhaps 10 years ago.  He is also had a collapse of his tarsometatarsal joint.  He has worn stiff shoes with orthotics in the past and this seems to be helpful.  He also states that now he is wearing a brace which helps it out.  It seems today his bigger complaint is his left foot.  He complains of a painful second toe.  He states his toes up and they are hitting his third toe.  He has pain with this.  Pain is aggravated by activity and relieved by rest.  He denies any ulcers or blistering at all.  Patient lives in a group home.  He recently just quit smoking.      ROS:  A 10-point review of systems was performed and is positive for that noted in the HPI and noted above.  All other areas are negative.        Allergies   Allergen Reactions     Benadryl [Diphenhydramine]      Bupropion      Seizure     Xanax [Alprazolam]        Current Outpatient Medications   Medication Sig Dispense Refill     acetaminophen (MAPAP) 325 MG tablet TAKE TWO  TABLETS (650MG) BY MOUTH EVERY SIX  HOURS AS NEEDED       acetaminophen (TYLENOL) 500 MG tablet Take 1,000 mg by mouth       albuterol (ACCUNEB) 1.25 MG/3ML neb solution NEBULIZE AND INHALE 1 VIAL EVERY 6 HOURS AS NEEDED FOR SHORTNESS OF BREATH /DYSPNEA OR WHEEZING (DX:J44.9) 300 mL 11     albuterol (PROAIR HFA/PROVENTIL HFA/VENTOLIN HFA) 108 (90 Base) MCG/ACT inhaler Inhale 2 puffs into the lungs every 6 hours as needed for shortness of breath / dyspnea or wheezing 1 Inhaler 3     albuterol (PROVENTIL) (2.5 MG/3ML) 0.083% neb solution Take 1 vial (2.5 mg) by nebulization every 6 hours as needed for shortness of breath / dyspnea or wheezing 1 Box 3     aspirin (ASPIRIN) 81 MG EC tablet Take 1 tablet (81 mg) by mouth daily 90 tablet 3     benzonatate (TESSALON) 100 MG capsule Take 1 capsule (100 mg) by mouth 3 times daily as needed for cough 30 capsule  0     budesonide-formoterol (SYMBICORT) 80-4.5 MCG/ACT Inhaler Inhale 2 puffs into the lungs daily 3 Inhaler 1     clonazePAM (KLONOPIN) 0.5 MG tablet Take 0.5 mg by mouth 2 times daily        famotidine (PEPCID) 20 MG tablet Take 1 tablet (20 mg) by mouth 2 times daily 180 tablet 1     haloperidol (HALDOL) 10 MG tablet 10 mg At Bedtime        haloperidol decanoate (HALDOL DECANOATE) 100 MG/ML injection Inject 100 mg into the muscle every 30 days        ibuprofen (ADVIL/MOTRIN) 600 MG tablet Take 1 tablet (600 mg) by mouth 3 times daily as needed for moderate pain 60 tablet 0     imatinib (GLEEVEC) 400 MG tablet Take 400 mg by mouth       nicotine (COMMIT) 2 MG lozenge Place 1 lozenge (2 mg) inside cheek every hour as needed for smoking cessation Take 1 lozenge by mouth as needed, maximum of 10 lozenges per day. 169 lozenge 1     nicotine (NICODERM CQ) 21 MG/24HR 24 hr patch Place 1 patch onto the skin every 24 hours 30 patch 3     omeprazole 20 MG tablet Take 20 mg by mouth       order for DME Equipment being ordered: Nebulizer 1 Device 0     order for DME Equipment being ordered: Wheelchair 1 Device 1     order for DME Equipment being ordered: Adult Briefs 3 Box 11     order for DME Equipment being ordered: [place] belt 1 Device 1     OXcarbazepine (TRILEPTAL) 600 MG tablet Take 600 mg by mouth 2 times daily        phenytoin (DILANTIN) 100 MG capsule Take 100 mg by mouth 2 times daily        polyethylene glycol (MIRALAX/GLYCOLAX) powder Take 1-2 tsp daily as needed 1 Bottle 3     propranolol (INDERAL) 10 MG tablet Take 10 mg by mouth       QUEtiapine (SEROQUEL) 50 MG tablet Take 50 mg by mouth 4 times daily        QUEtiapine ER (SEROQUEL XR) 300 MG 24 hr tablet Take 300 mg by mouth At Bedtime 2 caps at bedtime       senna (SENNA-TIME) 8.6 MG tablet Take 8.6-17.2 mg by mouth       trihexyphenidyl (ARTANE) 2 MG tablet        venlafaxine (EFFEXOR-XR) 75 MG 24 hr capsule Take 75 mg by mouth daily 3 caps every am          Patient Active Problem List   Diagnosis     Paranoid schizophrenia (H)     Monocular diplopia     Seizure disorder (H)     Hepatitis C virus carrier state (H)     Other constipation     Hyperammonemia (H)     Polysubstance dependence (H)     Substance-induced persisting dementia (H)     Paranoid personality (disorder) (H)     Grand mal seizure with intractable epilepsy (H)     Traumatic brain injury with loss of consciousness, sequela (H)     CML (chronic myelocytic leukemia) (H)     Smoker     Chronic obstructive pulmonary disease, unspecified COPD type (H)     Gastroesophageal reflux disease, esophagitis presence not specified     Frequent falls     Urinary incontinence, unspecified type     Hearing loss, unspecified hearing loss type, unspecified laterality     Impulse control disorder     Bilateral high frequency sensorineural hearing loss     Cervical disc displacement     Charcot's joint of foot, unspecified laterality     Chronic hepatitis C (H)     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, sequela (H)     Ground glass opacity present on imaging of lung     History of foot ulcer     Mixed simple and mucopurulent chronic bronchitis (H)     Neutrophilic leukocytosis     Normochromic normocytic anemia     Orthostatic hypotension     Polypharmacy     Vitamin D deficiency     Right ankle pain, unspecified chronicity       Past Medical History:   Diagnosis Date     Chronic obstructive pulmonary disease, unspecified COPD type (H) 9/11/2019     CML (chronic myelocytic leukemia) (H) 9/11/2019     Frequent falls 9/11/2019     Gastroesophageal reflux disease, esophagitis presence not specified 9/11/2019     Grand mal seizure with intractable epilepsy (H) 9/11/2019     Hearing loss, unspecified hearing loss type, unspecified laterality 9/11/2019     Hepatitis C virus carrier state (H) 9/11/2019     Hyperammonemia (H) 9/11/2019     Monocular diplopia 9/11/2019     Other constipation 9/11/2019     Paranoid  personality (disorder) (H) 2019     Paranoid schizophrenia (H) 2019     Polysubstance dependence (H) 2019     Seizure disorder (H) 2019     Smoker 2019     Substance-induced persisting dementia (H) 2019     Traumatic brain injury with loss of consciousness, sequela (H) 2019     Urinary incontinence, unspecified type 2019       No past surgical history on file.    No family history on file.    Social History     Tobacco Use     Smoking status: Former Smoker     Packs/day: 0.00     Last attempt to quit: 2019     Years since quittin.3     Smokeless tobacco: Never Used   Substance Use Topics     Alcohol use: Yes       Objective:    O:  /62 (BP Location: Right arm)   Pulse 90   Wt 84.4 kg (186 lb)   BMI 28.70 kg/m  .      Constitutional/ general:  Pt is in no apparent distress, appears well-nourished.  Cooperative with history and physical exam.     Psych:  The patient answered questions appropriately.  Normal affect.  Seems to have reasonable expectations, in terms of treatment.     Eyes:  Visual scanning/ tracking without deficit.     Ears:  Response to auditory stimuli is normal.    Auricles in proper alignment.     Lymphatic:  Popliteal lymph nodes not enlarged.     Lungs:  Non labored breathing, non labored speech. No cough.  No audible wheezing. Even, quiet breathing.       Vascular:  positive pedal pulses bilaterally.  CFT < 3 sec.  positive ankle edema.  negative pedal hair growth.    Neuro:  Alert and oriented x 3. Coordinated gait.  Light touch sensation is intact to the L4, L5, S1 distributions. No obvious deficits.  No evidence of neurological-based weakness, spasticity, or contracture in the lower extremities.      Derm: Skin thin shiny and atrophic with no hair growth noted    Musculoskeletal:    Lower extremity muscle strength is normal.  Patient is ambulatory without an assistive device or brace.  His left foot has no obvious forefoot or rear foot  deformities noted except for his second toe which is slightly elevated and laterally deviated.  We can reduce this.  On the right foot he has a collapse of his arch.  He has abduction of his forefoot at his tarsometatarsal joint.  The medial first tarsometatarsal joint is prominent.  He has a decreased range of motion at his ankle.  There is no signs of any skin breakdown or blistering on his right foot.  He is wearing a very good stiff supportive shoes today.      FOOT RIGHT THREE OR MORE VIEWS December 4, 2019 12:34 PM      HISTORY: Right ankle pain, unspecified chronicity.                                                                      IMPRESSION: Severe degenerative changes at the tarsometatarsal joints  with plantar angulation. This could represent early neuroarthropathy  but is nonspecific. Marked osteopenia is noted.      ANKLE RIGHT THREE OR MORE VIEWS December 4, 2019 12:38 PM      HISTORY: Right ankle pain, unspecified chronicity.                                                                      IMPRESSION: Two medial malleolar screws. There is a long longitudinal  screw within the distal fibula and a transverse interfragmentary screw  which appears to transfix a healed fibular fracture. No acute fracture  is demonstrated. Severe mid foot degeneration is noted on the edge of  the radiographs.    Assessment:   Right ankle arthritis status post ankle fracture  Right tarsometatarsal joint collapse with arthritis   left second hammertoe    Plan:  Xray from past personally reviewed.  Explained to patient he has ankle arthritis.  We discussed the cause of his midfoot collapse.  He is wearing a good stiff supportive shoe.  He has an ankle brace which she says is comfortable.  He will continue using these.  Another option is an [place] AFO.   Surprisingly patient is complaining more today about his left second hammertoe and his right lower extremity arthritis.  Explained the cause of his left second  nerissa.  Discussed conservative treatments such as good shoes with wide forefoot.  Dispensed budin splint.  Showed how patient to reduce this daily to keep joints supple.  Good quality shoes to keep pressure off toe.   Return to clinic prn.  Thank you for allowing me participate in the care of this patient.        Santiago Mcarthur DPM DPM, FACFAS

## 2020-01-15 NOTE — LETTER
1/15/2020         RE: Espinoza Willoughby  [address] [facility] [address]        Dear Colleague,    Thank you for referring your patient, Espinoza Willoughby, to the [facility]. Please see a copy of my visit note below.     Subjective:    Pt is seen today in consult from Andres Rubio to evaluate both of his feet.  Has a history of a right ankle fracture perhaps 10 years ago.  He is also had a collapse of his tarsometatarsal joint.  He has worn stiff shoes with orthotics in the past and this seems to be helpful.  He also states that now he is wearing a brace which helps it out.  It seems today his bigger complaint is his left foot.  He complains of a painful second toe.  He states his toes up and they are hitting his third toe.  He has pain with this.  Pain is aggravated by activity and relieved by rest.  He denies any ulcers or blistering at all.  Patient lives in a group home.  He recently just quit smoking.      ROS:  A 10-point review of systems was performed and is positive for that noted in the HPI and noted above.  All other areas are negative.        Allergies   Allergen Reactions     Benadryl [Diphenhydramine]      Bupropion      Seizure     Xanax [Alprazolam]        Current Outpatient Medications   Medication Sig Dispense Refill     acetaminophen (MAPAP) 325 MG tablet TAKE TWO  TABLETS (650MG) BY MOUTH EVERY SIX  HOURS AS NEEDED       acetaminophen (TYLENOL) 500 MG tablet Take 1,000 mg by mouth       albuterol (ACCUNEB) 1.25 MG/3ML neb solution NEBULIZE AND INHALE 1 VIAL EVERY 6 HOURS AS NEEDED FOR SHORTNESS OF BREATH /DYSPNEA OR WHEEZING (DX:J44.9) 300 mL 11     albuterol (PROAIR HFA/PROVENTIL HFA/VENTOLIN HFA) 108 (90 Base) MCG/ACT inhaler Inhale 2 puffs into the lungs every 6 hours as needed for shortness of breath / dyspnea or wheezing 1 Inhaler 3     albuterol (PROVENTIL) (2.5 MG/3ML) 0.083% neb solution Take 1 vial (2.5 mg) by nebulization every 6 hours as needed for shortness of  breath / dyspnea or wheezing 1 Box 3     aspirin (ASPIRIN) 81 MG EC tablet Take 1 tablet (81 mg) by mouth daily 90 tablet 3     benzonatate (TESSALON) 100 MG capsule Take 1 capsule (100 mg) by mouth 3 times daily as needed for cough 30 capsule 0     budesonide-formoterol (SYMBICORT) 80-4.5 MCG/ACT Inhaler Inhale 2 puffs into the lungs daily 3 Inhaler 1     clonazePAM (KLONOPIN) 0.5 MG tablet Take 0.5 mg by mouth 2 times daily        famotidine (PEPCID) 20 MG tablet Take 1 tablet (20 mg) by mouth 2 times daily 180 tablet 1     haloperidol (HALDOL) 10 MG tablet 10 mg At Bedtime        haloperidol decanoate (HALDOL DECANOATE) 100 MG/ML injection Inject 100 mg into the muscle every 30 days        ibuprofen (ADVIL/MOTRIN) 600 MG tablet Take 1 tablet (600 mg) by mouth 3 times daily as needed for moderate pain 60 tablet 0     imatinib (GLEEVEC) 400 MG tablet Take 400 mg by mouth       nicotine (COMMIT) 2 MG lozenge Place 1 lozenge (2 mg) inside cheek every hour as needed for smoking cessation Take 1 lozenge by mouth as needed, maximum of 10 lozenges per day. 169 lozenge 1     nicotine (NICODERM CQ) 21 MG/24HR 24 hr patch Place 1 patch onto the skin every 24 hours 30 patch 3     omeprazole 20 MG tablet Take 20 mg by mouth       order for DME Equipment being ordered: Nebulizer 1 Device 0     order for DME Equipment being ordered: Wheelchair 1 Device 1     order for DME Equipment being ordered: Adult Briefs 3 Box 11     order for DME Equipment being ordered: [place] belt 1 Device 1     OXcarbazepine (TRILEPTAL) 600 MG tablet Take 600 mg by mouth 2 times daily        phenytoin (DILANTIN) 100 MG capsule Take 100 mg by mouth 2 times daily        polyethylene glycol (MIRALAX/GLYCOLAX) powder Take 1-2 tsp daily as needed 1 Bottle 3     propranolol (INDERAL) 10 MG tablet Take 10 mg by mouth       QUEtiapine (SEROQUEL) 50 MG tablet Take 50 mg by mouth 4 times daily        QUEtiapine ER (SEROQUEL XR) 300 MG 24 hr tablet Take 300 mg by  mouth At Bedtime 2 caps at bedtime       senna (SENNA-TIME) 8.6 MG tablet Take 8.6-17.2 mg by mouth       trihexyphenidyl (ARTANE) 2 MG tablet        venlafaxine (EFFEXOR-XR) 75 MG 24 hr capsule Take 75 mg by mouth daily 3 caps every am         Patient Active Problem List   Diagnosis     Paranoid schizophrenia (H)     Monocular diplopia     Seizure disorder (H)     Hepatitis C virus carrier state (H)     Other constipation     Hyperammonemia (H)     Polysubstance dependence (H)     Substance-induced persisting dementia (H)     Paranoid personality (disorder) (H)     Grand mal seizure with intractable epilepsy (H)     Traumatic brain injury with loss of consciousness, sequela (H)     CML (chronic myelocytic leukemia) (H)     Smoker     Chronic obstructive pulmonary disease, unspecified COPD type (H)     Gastroesophageal reflux disease, esophagitis presence not specified     Frequent falls     Urinary incontinence, unspecified type     Hearing loss, unspecified hearing loss type, unspecified laterality     Impulse control disorder     Bilateral high frequency sensorineural hearing loss     Cervical disc displacement     Charcot's joint of foot, unspecified laterality     Chronic hepatitis C (H)     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, sequela (H)     Ground glass opacity present on imaging of lung     History of foot ulcer     Mixed simple and mucopurulent chronic bronchitis (H)     Neutrophilic leukocytosis     Normochromic normocytic anemia     Orthostatic hypotension     Polypharmacy     Vitamin D deficiency     Right ankle pain, unspecified chronicity       Past Medical History:   Diagnosis Date     Chronic obstructive pulmonary disease, unspecified COPD type (H) 9/11/2019     CML (chronic myelocytic leukemia) (H) 9/11/2019     Frequent falls 9/11/2019     Gastroesophageal reflux disease, esophagitis presence not specified 9/11/2019     Grand mal seizure with intractable epilepsy (H) 9/11/2019      Hearing loss, unspecified hearing loss type, unspecified laterality 2019     Hepatitis C virus carrier state (H) 2019     Hyperammonemia (H) 2019     Monocular diplopia 2019     Other constipation 2019     Paranoid personality (disorder) (H) 2019     Paranoid schizophrenia (H) 2019     Polysubstance dependence (H) 2019     Seizure disorder (H) 2019     Smoker 2019     Substance-induced persisting dementia (H) 2019     Traumatic brain injury with loss of consciousness, sequela (H) 2019     Urinary incontinence, unspecified type 2019       No past surgical history on file.    No family history on file.    Social History     Tobacco Use     Smoking status: Former Smoker     Packs/day: 0.00     Last attempt to quit: 2019     Years since quittin.3     Smokeless tobacco: Never Used   Substance Use Topics     Alcohol use: Yes       Objective:    O:  /62 (BP Location: Right arm)   Pulse 90   Wt 84.4 kg (186 lb)   BMI 28.70 kg/m   .      Constitutional/ general:  Pt is in no apparent distress, appears well-nourished.  Cooperative with history and physical exam.     Psych:  The patient answered questions appropriately.  Normal affect.  Seems to have reasonable expectations, in terms of treatment.     Eyes:  Visual scanning/ tracking without deficit.     Ears:  Response to auditory stimuli is normal.    Auricles in proper alignment.     Lymphatic:  Popliteal lymph nodes not enlarged.     Lungs:  Non labored breathing, non labored speech. No cough.  No audible wheezing. Even, quiet breathing.       Vascular:  positive pedal pulses bilaterally.  CFT < 3 sec.  positive ankle edema.  negative pedal hair growth.    Neuro:  Alert and oriented x 3. Coordinated gait.  Light touch sensation is intact to the L4, L5, S1 distributions. No obvious deficits.  No evidence of neurological-based weakness, spasticity, or contracture in the lower extremities.       Derm: Skin thin shiny and atrophic with no hair growth noted    Musculoskeletal:    Lower extremity muscle strength is normal.  Patient is ambulatory without an assistive device or brace.  His left foot has no obvious forefoot or rear foot deformities noted except for his second toe which is slightly elevated and laterally deviated.  We can reduce this.  On the right foot he has a collapse of his arch.  He has abduction of his forefoot at his tarsometatarsal joint.  The medial first tarsometatarsal joint is prominent.  He has a decreased range of motion at his ankle.  There is no signs of any skin breakdown or blistering on his right foot.  He is wearing a very good stiff supportive shoes today.      FOOT RIGHT THREE OR MORE VIEWS December 4, 2019 12:34 PM      HISTORY: Right ankle pain, unspecified chronicity.                                                                      IMPRESSION: Severe degenerative changes at the tarsometatarsal joints  with plantar angulation. This could represent early neuroarthropathy  but is nonspecific. Marked osteopenia is noted.      ANKLE RIGHT THREE OR MORE VIEWS December 4, 2019 12:38 PM      HISTORY: Right ankle pain, unspecified chronicity.                                                                      IMPRESSION: Two medial malleolar screws. There is a long longitudinal  screw within the distal fibula and a transverse interfragmentary screw  which appears to transfix a healed fibular fracture. No acute fracture  is demonstrated. Severe mid foot degeneration is noted on the edge of  the radiographs.    Assessment:   Right ankle arthritis status post ankle fracture  Right tarsometatarsal joint collapse with arthritis   left second hammertoe    Plan:  Xray from past personally reviewed.  Explained to patient he has ankle arthritis.  We discussed the cause of his midfoot collapse.  He is wearing a good stiff supportive shoe.  He has an ankle brace which she says is  comfortable.  He will continue using these.  Another option is an [place] AFO.   Surprisingly patient is complaining more today about his left second hammertoe and his right lower extremity arthritis.  Explained the cause of his left second hammertoe.  Discussed conservative treatments such as good shoes with wide forefoot.  Dispensed budin splint.  Showed how patient to reduce this daily to keep joints supple.  Good quality shoes to keep pressure off toe.   Return to clinic prn.  Thank you for allowing me participate in the care of this patient.        Santiago Mcarthur DPM DPM, FACFAS        Again, thank you for allowing me to participate in the care of your patient.        Sincerely,        Santiago Mcarthur DPM

## 2020-01-15 NOTE — PATIENT INSTRUCTIONS
Weight management plan: Patient was referred to their PCP to discuss a diet and exercise plan.  We wish you continued good healing. If you have any questions or concerns, please do not hesitate to contact us at [phone]    Please remember to call and schedule a follow up appointment if one was recommended at your earliest convenience.   PODIATRY CLINIC HOURS  TELEPHONE NUMBER    Dr. Santiago Mcarthur D.P.[facility]    Clinics:  [facility]    [facility]   Tuesday 1PM-6PM  [place]  Wednesday 7AM-2PM  [facility]  Thursday 10AM-6PM  [place]  Friday 7AM-3PM  [place]  Specialty schedulers:   [phone] to make an appointment with any Specialty Provider.        Urgent Care locations:    [facility] Monday-Friday 5 pm - 9 pm. Saturday-Sunday 9 am -5pm    Monday-Friday 11 am - 9 pm Saturday 9 am - 5 pm     Monday-Sunday 12 noon-8PM [phone]     If you need a medication refill, please contact us you may need lab work and/or a follow up visit prior to your refill (i.e. Antifungal medications).    [facility] (secure e-mail communication and access to your chart) to send a message or to make an appointment.    If MRI needed please call [facility] at [phone]

## 2020-01-16 NOTE — TELEPHONE ENCOUNTER
To provider to advise if ok to increase pepcid to TID, need new prescription      Zora SUEN, RN, CPN

## 2020-01-16 NOTE — TELEPHONE ENCOUNTER
Patient's CC Jyoti called today.    States that patient is currently taking medication Prepanolol.    This medication is giving patient stomach pain.  However this medication is working for patient.    Also, states that patient is currently taking Pepsid two times a day.  Patient would like to take 3 times per day instead.    Please contact Jyoti at [phone].    Would like medication sent to [facility].    Thank you.    Central Scheduling  Amanda TORRES

## 2020-01-17 NOTE — TELEPHONE ENCOUNTER
Left message on answering machine for care coordinator to call back to [phone].  Kimberly SUEN, RN

## 2020-01-20 NOTE — TELEPHONE ENCOUNTER
Left message on voicemail for Jyoti (Care Coordinator) to call me back. Kirstin MCLEAN, [phone]

## 2020-01-21 NOTE — TELEPHONE ENCOUNTER
Attempted to reach Jyoti regarding message below regarding pt. There was no answer. LM to return call to RN at [phone] to discuss.    Joleen Fernandez, LINETTEN, RN

## 2020-01-21 NOTE — TELEPHONE ENCOUNTER
FYI: Patient had prescription for propranolol 10mg, take one tab by mouth 3 times daily if needed for SBP>155.    REM worker; Lucina states that it had been ordered by prior doctor in [place]. She assumes it for hypertension but isn't sure.  They check his bp twice a day .  He has had to take the propranolol a few times this month due to sbp >155.  Chart does not reflect htn; does reflect orthostatic hypotension on problem list.  Is on no medications for hypertension per med list on daily basis.  Appointment made for patient next week with Andres Rubio PA-C.  Lucina will search for any information as to why he is taking this med on prn basis.  She will also bring his bp readings for this past month to appointment.  Kirstin Ngo RN

## 2020-01-23 NOTE — TELEPHONE ENCOUNTER
Ok to take propranolol twice a day until follow up  Apt.  Keep monitoring blood pressure twice a day until follow up  Apt.    Andres Rubio PA-C

## 2020-01-29 PROBLEM — I95.1 ORTHOSTATIC HYPOTENSION: Status: RESOLVED | Noted: 2019-07-15 | Resolved: 2020-01-01

## 2020-01-29 NOTE — PROGRESS NOTES
Subjective     Espinoza iWlloughby is a 69 year old male with a history of seizures, traumatic brain injury, polysubstance abuse, COPD, chronic hep C, CML, paranoid personality disorder who presents to clinic today for the following health issues: Came with staff from [facility].  They are concerned about his blood pressure readings at the [facility].  They been above 140/90 consistently.  They do improve after he takes his Seroquel .  He denies any chest pain or shortness of breath he denies any nausea vomiting diarrhea.  HPI   Hypertension Follow-up      Do you check your blood pressure regularly outside of the clinic? Yes     Are you following a low salt diet? No    Are your blood pressures ever more than 140 on the top number (systolic) OR more   than 90 on the bottom number (diastolic), for example 140/90? Yes      How many servings of fruits and vegetables do you eat daily?  0-1    On average, how many sweetened beverages do you drink each day (Examples: soda, juice, sweet tea, etc.  Do NOT count diet or artificially sweetened beverages)?   0    How many days per week do you exercise enough to make your heart beat faster? 3 or less    How many minutes a day do you exercise enough to make your heart beat faster? 9 or less    How many days per week do you miss taking your medication? 0      Musculoskeletal problem/pain      Duration: 2 weeks    Description  Location: leg pain in both legs    Intensity:  mild    Accompanying signs and symptoms: none    History  Previous similar problem: no   Previous evaluation:  none    Precipitating or alleviating factors:  Trauma or overuse: no   Aggravating factors include: walking    Therapies tried and outcome: ibuprofen PRN - helps to be active.       Patient Active Problem List   Diagnosis     Paranoid schizophrenia (H)     Monocular diplopia     Seizure disorder (H)     Hepatitis C virus carrier state (H)     Other constipation     Hyperammonemia (H)     Polysubstance  dependence (H)     Substance-induced persisting dementia (H)     Paranoid personality (disorder) (H)     Grand mal seizure with intractable epilepsy (H)     Traumatic brain injury with loss of consciousness, sequela (H)     CML (chronic myelocytic leukemia) (H)     Smoker     Chronic obstructive pulmonary disease, unspecified COPD type (H)     Gastroesophageal reflux disease, esophagitis presence not specified     Frequent falls     Urinary incontinence, unspecified type     Hearing loss, unspecified hearing loss type, unspecified laterality     Impulse control disorder     Bilateral high frequency sensorineural hearing loss     Cervical disc displacement     Charcot's joint of foot, unspecified laterality     Chronic hepatitis C (H)     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    Social History     Tobacco Use     Smoking status: Former Smoker     Packs/day: 0.00     Last attempt to quit: 2019     Years since quittin.4     Smokeless tobacco: Never Used   Substance Use Topics     Alcohol use: Yes     No family history on file.      Current Outpatient Medications   Medication Sig Dispense Refill     acetaminophen (MAPAP) 325 MG tablet TAKE TWO  TABLETS (650MG) BY MOUTH EVERY SIX  HOURS AS NEEDED       acetaminophen (TYLENOL) 500 MG tablet Take 1,000 mg by mouth       albuterol (ACCUNEB) 1.25 MG/3ML neb solution NEBULIZE AND INHALE 1 VIAL EVERY 6 HOURS AS NEEDED FOR SHORTNESS OF BREATH /DYSPNEA OR WHEEZING (DX:J44.9) 300 mL 11     albuterol (PROAIR HFA/PROVENTIL HFA/VENTOLIN HFA) 108 (90 Base) MCG/ACT inhaler Inhale 2 puffs into the lungs every 6 hours as needed for  shortness of breath / dyspnea or wheezing 1 Inhaler 3     albuterol (PROVENTIL) (2.5 MG/3ML) 0.083% neb solution Take 1 vial (2.5 mg) by nebulization every 6 hours as needed for shortness of breath / dyspnea or wheezing 1 Box 3     aspirin (ASPIRIN) 81 MG EC tablet Take 1 tablet (81 mg) by mouth daily 90 tablet 3     budesonide-formoterol (SYMBICORT) 80-4.5 MCG/ACT Inhaler Inhale 2 puffs into the lungs daily 3 Inhaler 1     clonazePAM (KLONOPIN) 0.5 MG tablet Take 0.5 mg by mouth 2 times daily        famotidine (PEPCID) 20 MG tablet Take 1 tablet (20 mg) by mouth 2 times daily 180 tablet 4     haloperidol (HALDOL) 10 MG tablet 10 mg At Bedtime        haloperidol decanoate (HALDOL DECANOATE) 100 MG/ML injection Inject 100 mg into the muscle every 30 days        ibuprofen (ADVIL/MOTRIN) 600 MG tablet Take 1 tablet (600 mg) by mouth 3 times daily as needed for moderate pain 60 tablet 0     imatinib (GLEEVEC) 400 MG tablet Take 400 mg by mouth       metoprolol succinate ER (TOPROL-XL) 25 MG 24 hr tablet Take 1 tablet (25 mg) by mouth daily 90 tablet 0     naproxen (NAPROSYN) 500 MG tablet Take 1 tablet (500 mg) by mouth 2 times daily (with meals) 60 tablet 0     nicotine (COMMIT) 2 MG lozenge Place 1 lozenge (2 mg) inside cheek every hour as needed for smoking cessation Take 1 lozenge by mouth as needed, maximum of 10 lozenges per day. 169 lozenge 1     nicotine (NICODERM CQ) 21 MG/24HR 24 hr patch Place 1 patch onto the skin every 24 hours 30 patch 3     omeprazole 20 MG tablet Take 20 mg by mouth       order for DME Equipment being ordered: Nebulizer 1 Device 0     order for DME Equipment being ordered: Wheelchair 1 Device 1     order for DME Equipment being ordered: Adult Briefs 3 Box 11     order for DME Equipment being ordered: [place] belt 1 Device 1     OXcarbazepine (TRILEPTAL) 600 MG tablet Take 600 mg by mouth 2 times daily        phenytoin (DILANTIN) 100 MG capsule Take 100 mg by mouth 2 times daily         "polyethylene glycol (MIRALAX/GLYCOLAX) powder Take 1-2 tsp daily as needed 1 Bottle 3     QUEtiapine (SEROQUEL) 50 MG tablet Take 50 mg by mouth 4 times daily        QUEtiapine ER (SEROQUEL XR) 300 MG 24 hr tablet Take 300 mg by mouth At Bedtime 2 caps at bedtime       senna (SENNA-TIME) 8.6 MG tablet Take 8.6-17.2 mg by mouth       trihexyphenidyl (ARTANE) 2 MG tablet        venlafaxine (EFFEXOR-XR) 75 MG 24 hr capsule Take 75 mg by mouth daily 3 caps every am       Allergies   Allergen Reactions     Benadryl [Diphenhydramine]      Bupropion      Seizure     Xanax [Alprazolam]      BP Readings from Last 3 Encounters:   01/29/20 117/65   01/15/20 119/62   12/11/19 126/65    Wt Readings from Last 3 Encounters:   01/29/20 86.2 kg (190 lb)   01/15/20 84.4 kg (186 lb)   12/11/19 80.3 kg (177 lb)                 Reviewed and updated as needed this visit by Provider         Review of Systems   ROS COMP: Constitutional, HEENT, cardiovascular, pulmonary, gi and gu systems are negative, except as otherwise noted.      Objective    /65   Pulse 83   Temp 97.6  F (36.4  C) (Oral)   Resp 18   Ht 1.715 m (5' 7.5\")   Wt 86.2 kg (190 lb)   SpO2 97%   BMI 29.32 kg/m    Body mass index is 29.32 kg/m .  Physical Exam   GENERAL: healthy, alert and no distress  NECK: no adenopathy, no asymmetry, masses, or scars and thyroid normal to palpation  RESP: lungs clear to auscultation - no rales, rhonchi or wheezes  CV: regular rate and rhythm, normal S1 S2, no S3 or S4, no murmur, click or rub, no peripheral edema and peripheral pulses strong  ABDOMEN: soft, nontender, no hepatosplenomegaly, no masses and bowel sounds normal  MS: no gross musculoskeletal defects noted, no edema- diffuse tenderness an lower extremities. Calves soft non-tender 5/5 an lower extremities. Neurovascularly Intact Distally.      Diagnostic Test Results:  Labs reviewed in Epic        Assessment & Plan       ICD-10-CM    1. Hypertension goal BP (blood " pressure) < 140/90 I10 metoprolol succinate ER (TOPROL-XL) 25 MG 24 hr tablet   2. Pain in both lower extremities M79.604 CBC with platelets differential    M79.605 Comprehensive metabolic panel     naproxen (NAPROSYN) 500 MG tablet   1. Will stop propranolol and start metoprolo. 25mg daily. warning signs discussed. side effects discussed  Recheck 2 wks.   2. Unclear etiology: labs pending. Ok for naproxen twice a day as needed.     Return in about 2 weeks (around 2/12/2020) for recheck.    Andres Rubio PA-C  [facility]

## 2020-01-29 NOTE — PATIENT INSTRUCTIONS
call [phone] to schedule appointment with lung nodule clinic.     Will start metoprolol 25mg daily for blood pressure control. con't blood pressure twice a day for now.     con't activities as tolerate for leg pain. Ok for naproxen twice a day as needed.     Lab work pending    Recheck 2 wks.       Andres Rubio PA-C

## 2020-01-30 NOTE — TELEPHONE ENCOUNTER
This RN huddled with Andres Rubio PA-C to get clarification on below order.     -- Andres Rubio PA-C wants patient to take the metoprolol EVERY day.    -- HOLD metoprolol if BP is <100/60 OR if patient has dizziness or lightheadedness.   -- See Andres Rubio PA-C in 1-2 weeks for recheck appointment.       Left message on [employer] voicemail to call back.     Direct number given: [phone].  Larissa Cast BSN, RN

## 2020-01-30 NOTE — TELEPHONE ENCOUNTER
Group home calling patient was seen in clinic yesterday and started on metoprolol, need clarification on directions. Need parameters or direction if to hold medication if at a certain blood pressure etc... please call to advise.

## 2020-01-30 NOTE — TELEPHONE ENCOUNTER
RN returned call to Lucina who states pt's previous propranolol order was not given daily. She believes it was only administered to pt 4 times in January 2020.    She states there had been parameters on the propranolol to only administer when pt's BP was elevated.   She states she believes the parameter was to only administer propranolol if systolic BP > 160.    1. Please confirm if the prn propranolol should be replaced with daily metoprolol.  2. Please confirm if parameters should be included on the daily metoprolol order to hold if BP is < 100/60, or other.    metoprolol succinate ER (TOPROL-XL) 25 MG 24 hr tablet 90 tablet 0 1/29/2020  --   Sig - Route: Take 1 tablet (25 mg) by mouth daily     LINETTE SampsonN, RN

## 2020-01-30 NOTE — TELEPHONE ENCOUNTER
Metoprolol to take daily it to replace propranolol.   Stop propranolol.     Can't same blood pressure parameters.     Andres Rubio PA-C

## 2020-01-30 NOTE — TELEPHONE ENCOUNTER
To take Metoprolol as RX.     Daily don't stop medication.   Ok for blood pressure check twice a day.   Let me know if any symptoms of lightheadedness or dizziness.     Recheck with me in 2 wks.

## 2020-01-31 NOTE — TELEPHONE ENCOUNTER
Called Lucina at group home and gave her below order from Andres Rubio PA-C. She understands this.  She asked me to resend Rx for naproxen because 2 orders were sent to pharmacy for this medication: 1 was for prn and the other was for scheduled twice daily.      Rx resent.     Larissa SUEN, RN

## 2020-02-03 NOTE — TELEPHONE ENCOUNTER
Reason for Call:  Home Health Care    Lucina with [facility] called regarding (reason for call): states patient is not benefiting from RX: naproxen and would like to request for a stronger ibuprofen instead. Please call to discuss. Thank you.    Orders are needed for this patient.     PT:     OT:     Skilled Nursing:     Pt Provider: Ramiro    Phone Number Homecare Nurse can be reached at: [phone]    Can we leave a detailed message on this number? YES    Phone number patient can be reached at:     Best Time:     Call taken on 2/3/2020 at 2:31 PM by Bhavna Rush

## 2020-02-03 NOTE — TELEPHONE ENCOUNTER
Patient taking Naproxen for lower leg pain  Patient only took 1 time, patient stated leg pain felt worse and legs felt weaker and now refuses to take anymore  Requesting different prescription       Would need discontinue orders sent to [facility] as well for the Naproxen - need ok to discontinue from provider      Zora SUEN, RN, CPN

## 2020-02-04 NOTE — TELEPHONE ENCOUNTER
ONCOLOGY INTAKE: Records Information      APPT INFORMATION:  Referring provider: Self  Referring provider s clinic:  N/a  Reason for visit/diagnosis:  Pulmonary Nodules  Has patient been notified of appointment date and time?: Yes    RECORDS INFORMATION:  Were the records received with the referral (via [employer])? No    Has patient been seen for any external appt for this diagnosis? No    If yes, where? N/a    Has patient had any imaging or procedures outside of [facility] for this condition? Yes (CT)      If Yes, where? [facility]    ADDITIONAL INFORMATION:  None

## 2020-02-04 NOTE — TELEPHONE ENCOUNTER
Diclofenac was sent to pharmacy.  Use twice a day as needed.   Recheck 4 wks as needed     Andres Rubio PA-C

## 2020-02-04 NOTE — TELEPHONE ENCOUNTER
Left detailed voicemail for [facility], as written below.  .  Advise to call back with additional questions.   direct line # [phone].   Fiona Akins RN

## 2020-02-11 NOTE — PROGRESS NOTES
"Subjective     Espinoza Willoughby is a 69 year old male who presents to clinic today for the following health issues:    HPI   {SUPERLIST (Optional):775535}  Hypertension Follow-up      Do you check your blood pressure regularly outside of the clinic? { :419248}     Are you following a low salt diet? { :950526}    Are your blood pressures ever more than 140 on the top number (systolic) OR more   than 90 on the bottom number (diastolic), for example 140/90? { :131697}      {HIST REVIEW/ LINKS 2 (Optional):741917}    {Additional problems for the provider to add (optional):412697}  Reviewed and updated as needed this visit by Provider         Review of Systems   {ROS COMP (Optional):091250}      Objective    There were no vitals taken for this visit.  There is no height or weight on file to calculate BMI.  Physical Exam   {Exam List (Optional):317566}    {Diagnostic Test Results (Optional):091885::\"Diagnostic Test Results:\",\"Labs reviewed in Epic\"}        {PROVIDER CHARTING PREFERENCE:158897}      "

## 2020-02-17 NOTE — PROGRESS NOTES
LUNG NODULE & INTERVENTIONAL PULMONARY CLINIC  [facility], [facility], [facility]     Espinoza Willoughby MRN# [id number]   Age: 69 year old YOB: 1950     Reason for Consultation: lung nodule(s)       Assessment and Plan:    1. New multiple pulmonary lung nodule(s). Given the characteristics on current/previous imaging and risk factors; I would classify this to be Intermediate (6-65%) risk for cancer. Culprit nodule is in MICK which is suspicious for primary lung cancer. We discussed that bx would be necessary however he would like to defer at this time. Additionally, he is not sure if he would want treatment for cancer. Will plan for short term CT in 3mo.     2. COPD. Stable on inhalers and up-to-date on vaccinations. Will get PFT on next visit.     Billing: The patient was seen and examined by me and the findings, assessment, and plan as documented was explained to the patient/family who expressed understand.     Frankie Hooks MD   of Medicine  Interventional Pulmonology  Department of Pulmonary, Allergy, Critical Care and Sleep Medicine   [facility]  Pager: [phone]          History:     Espinoza Willoughby is a 69 year old male with sig h/o for COPD, CML, seizure, HCV, schizophrenia, paranoid disorder, TBI who is here for evaluation/followup of lung mass .    - No new resp sx or complaints. Denies dyspnea or cough.   - Had episode of bronchitis and evaluated with CT chest demonstrating nodules  - Personal hx of cancer: CML. 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 No radon.    - Tobacco hx: Past Smoker: 0.5ppd for 42-years. Quit 8wks ago  - My interpretation of the images relevant for this visit includes: nodules   - My interpretation of the PFT's relevant for this visit includes: None     Culprit Nodule(s):   1: Left upper lobe  nodule and is 12 mm in size/severity and non-calcified and spiculated in morphology/quality. First seen by chest CT on 2019. There is no interval change.  2. Multiple bilateral lung nodule(s) that are sub 6 mm. First seen by chest CT on 2019. There is no interval change    Other active medical problems include:   - had recurrent falls resulting in TBI. Now residing in group home for past 13yrs.    - has CML. Currently on gleevac and cont surveillance.    - has COPD. On symbicort and albuterol prn. Up-to-date on flu and PNA vaccination. Recently AECOPD 3wks ago and recovered with abx+prednisone.    - has paranoid disorder and schizophrenia   - Has seizures on AE.          Past Medical History:      Past Medical History:   Diagnosis Date     Chronic obstructive pulmonary disease, unspecified COPD type (H) 2019     CML (chronic myelocytic leukemia) (H) 2019     Frequent falls 2019     Gastroesophageal reflux disease, esophagitis presence not specified 2019     Grand mal seizure with intractable epilepsy (H) 2019     Hearing loss, unspecified hearing loss type, unspecified laterality 2019     Hepatitis C virus carrier state (H) 2019     Hyperammonemia (H) 2019     Monocular diplopia 2019     Other constipation 2019     Paranoid personality (disorder) (H) 2019     Paranoid schizophrenia (H) 2019     Polysubstance dependence (H) 2019     Seizure disorder (H) 2019     Smoker 2019     Substance-induced persisting dementia (H) 2019     Traumatic brain injury with loss of consciousness, sequela (H) 2019     Urinary incontinence, unspecified type 2019           Past Surgical History:    No past surgical history on file.       Social History:     Social History     Tobacco Use     Smoking status: Current Some Day Smoker     Packs/day: 0.00     Types: Cigarettes     Last attempt to quit: 2019     Years since quittin.4      Smokeless tobacco: Never Used   Substance Use Topics     Alcohol use: Yes          Family History:   No family history on file.        Allergies:      Allergies   Allergen Reactions     Benadryl [Diphenhydramine]      Bupropion      Seizure     Xanax [Alprazolam]           Medications:     Current Outpatient Medications   Medication Sig     acetaminophen (TYLENOL) 500 MG tablet Take 1,000 mg by mouth     albuterol (ACCUNEB) 1.25 MG/3ML neb solution NEBULIZE AND INHALE 1 VIAL EVERY 6 HOURS AS NEEDED FOR SHORTNESS OF BREATH /DYSPNEA OR WHEEZING (DX:J44.9)     albuterol (PROAIR HFA/PROVENTIL HFA/VENTOLIN HFA) 108 (90 Base) MCG/ACT inhaler Inhale 2 puffs into the lungs every 6 hours as needed for shortness of breath / dyspnea or wheezing     aspirin (ASPIRIN) 81 MG EC tablet Take 1 tablet (81 mg) by mouth daily     budesonide-formoterol (SYMBICORT) 80-4.5 MCG/ACT Inhaler Inhale 2 puffs into the lungs daily     clonazePAM (KLONOPIN) 0.5 MG tablet Take 0.5 mg by mouth 2 times daily      diclofenac (VOLTAREN) 50 MG EC tablet Take 1 tablet (50 mg) by mouth 2 times daily     famotidine (PEPCID) 20 MG tablet Take 1 tablet (20 mg) by mouth 2 times daily     haloperidol (HALDOL) 10 MG tablet 10 mg At Bedtime      haloperidol decanoate (HALDOL DECANOATE) 100 MG/ML injection Inject 100 mg into the muscle every 30 days      ibuprofen (ADVIL/MOTRIN) 600 MG tablet Take 1 tablet (600 mg) by mouth 3 times daily as needed for moderate pain     metoprolol succinate ER (TOPROL-XL) 25 MG 24 hr tablet Take 1 tablet (25 mg) by mouth daily     nicotine (COMMIT) 2 MG lozenge Place 1 lozenge (2 mg) inside cheek every hour as needed for smoking cessation Take 1 lozenge by mouth as needed, maximum of 10 lozenges per day.     nicotine (NICODERM CQ) 21 MG/24HR 24 hr patch Place 1 patch onto the skin every 24 hours     omeprazole 20 MG tablet Take 20 mg by mouth     order for DME Equipment being ordered: Nebulizer     order for DME Equipment being  ordered: Wheelchair     order for DME Equipment being ordered: Adult Briefs     order for DME Equipment being ordered: Ullin belt     phenytoin (DILANTIN) 100 MG capsule Take 100 mg by mouth 2 times daily      polyethylene glycol (MIRALAX/GLYCOLAX) powder Take 1-2 tsp daily as needed     QUEtiapine (SEROQUEL) 50 MG tablet Take 50 mg by mouth 4 times daily      acetaminophen (MAPAP) 325 MG tablet TAKE TWO  TABLETS (650MG) BY MOUTH EVERY SIX  HOURS AS NEEDED     albuterol (PROVENTIL) (2.5 MG/3ML) 0.083% neb solution Take 1 vial (2.5 mg) by nebulization every 6 hours as needed for shortness of breath / dyspnea or wheezing (Patient not taking: Reported on 2/17/2020)     imatinib (GLEEVEC) 400 MG tablet Take 400 mg by mouth     OXcarbazepine (TRILEPTAL) 600 MG tablet Take 600 mg by mouth 2 times daily      QUEtiapine ER (SEROQUEL XR) 300 MG 24 hr tablet Take 300 mg by mouth At Bedtime 2 caps at bedtime     senna (SENNA-TIME) 8.6 MG tablet Take 8.6-17.2 mg by mouth     trihexyphenidyl (ARTANE) 2 MG tablet      venlafaxine (EFFEXOR-XR) 75 MG 24 hr capsule Take 75 mg by mouth daily 3 caps every am     No current facility-administered medications for this visit.           Review of Systems:     CONSTITUTIONAL: negative for fever, chills, change in weight  INTEGUMENTARY/SKIN: no rash or obvious new lesions  ENT/MOUTH: no sore throat, new sinus pain or nasal drainage  RESP: see interval history  CV: negative for chest pain, palpitations or peripheral edema  GI: no nausea, vomiting, change in stools  : no dysuria  MUSCULOSKELETAL: no myalgias, arthralgias  ENDOCRINE: blood sugars with adequate control  PSYCHIATRIC: mood stable  LYMPHATIC: no new lymphadenopathy  HEME: no bleeding or easy bruisability  NEURO: no numbness, weakness, headaches         Physical Exam:     Pulse:  [74] 74  Resp:  [18] 18  BP: (163)/(77) 163/77  SpO2:  [98 %] 98 %  Wt Readings from Last 4 Encounters:   02/17/20 88.5 kg (195 lb)   01/29/20 86.2 kg  (190 lb)   01/15/20 84.4 kg (186 lb)   12/11/19 80.3 kg (177 lb)     Constitutional:   Awake, alert and in no apparent distress     Eyes:   Nonicteric, KASIA     ENT:    Trachea is midline. No gross neck abnormalities      Neck:   Supple without supraclavicular or cervical lymphadenopathy     Lungs:   Good air flow.  No crackles. No rhonchi.  No wheezes.     Cardiovascular:   Normal S1 and S2.  RRR.  No murmur, gallop or rub.  Radial, DP and PT pulses normal and symmetric     Abdomen:   NABS, soft, nontender, nondistended.  No HSM.     Musculoskeletal:   No edema.      Neurologic:   Alert and conversant. Cranial nerves  intact.       Skin:   Warm, dry.  No rash on limited exam.           Current Laboratory Data:   All laboratory and imaging data reviewed.           Previous Cardiology Imaging   No results found for this or any previous visit (from the past 8760 hour(s)).

## 2020-02-17 NOTE — NURSING NOTE
"Espinoza Willoughby's goals for this visit include: Consult  He requests these members of his care team be copied on today's visit information: PCP    PCP: Andres Rubio    Referring Provider:  Referred Self, MD  No address on file    BP (!) 163/77   Pulse 74   Resp 18   Ht 1.702 m (5' 7\")   Wt 88.5 kg (195 lb)   SpO2 98%   BMI 30.54 kg/m      Do you need any medication refills at today's visit? N    "

## 2020-02-17 NOTE — PATIENT INSTRUCTIONS
If you have had any blood work, imaging or other testing completed we will be in touch within 1-2 weeks regarding the results.     If you need refills please contact your pharmacy.     It was a pleasure meeting with you today. Please let us know if there is anything else we can do for you so that we can be sure you are leaving completely satisfied with your care experience.     If you have any questions, concerns or need to schedule a follow up, please contact us at [phone] and our fax [phone].    Your Pulmonology Team at [facility]

## 2020-02-18 NOTE — LETTER
" My COPD Action Plan     Name: Espinoza Willoughby    YOB: 1950   Date: 2/11/2020    My doctor: Andres Rubio PA-C   [facility] [place] [address] [facility] [address] [phone]  My Controller Medicine: { :738570}   Dose: ***     My Rescue Medicine: { :636742}   Dose: ***     My Flare Up Medicine: { :607478}   Dose: ***     My COPD Severity: { :381296}      Use of Oxygen: { :297265::\"Oxygen Not Prescribed \"}     Make sure you've had your pneumonia   vaccines.          GREEN ZONE       Doing well today      Usual level of activity and exercise    Usual amount of cough and mucus    No shortness of breath    Usual level of health (thinking clearly, sleeping well, feel like eating) Actions:      Take daily medicines    Use oxygen as prescribed    Follow regular exercise and diet plan    Avoid cigarette smoke and other irritants that harm the lungs           YELLOW ZONE          Having a bad day or flare up      Short of breath more than usual    A lot more sputum (mucus) than usual    Sputum looks yellow, green, tan, brown or bloody    More coughing or wheezing    Fever or chills    Less energy; trouble completing activities    Trouble thinking or focusing    Using quick relief inhaler or nebulizer more often    Poor sleep; symptoms wake me up    Do not feel like eating Actions:      Get plenty of rest    Take daily medicines    Use quick relief inhaler every *** hours    If you use oxygen, call you doctor to see if you should adjust your oxygen    Do breathing exercises or other things to help you relax    Let a loved one, friend or neighbor know you are feeling worse    Call your care team if you have 2 or more symptoms.  Start taking steroids or antibiotics if directed by your care team           RED ZONE       Need medical care now      Severe shortness of breath (feel you can't breathe)    Fever, chills    Not enough breath to do any activity    Trouble " coughing up mucus, walking or talking    Blood in mucus    Frequent coughing   Rescue medicines are not working    Not able to sleep because of breathing    Feel confused or drowsy    Chest pain    Actions:      Call your health care team.  If you cannot reach your care team, call 911 or go to the emergency room.        Annual Reminders:  Meet with Care Team, Flu Shot every Fall  Pharmacy: [facility]. - [place], [place] - [address]

## 2020-02-18 NOTE — PROGRESS NOTES
Subjective     Espinoza Willoughby is a 69 year old male who presents to clinic today for the following health issues:    History of Present Illness        Hypertension: He presents for follow up of hypertension.  He does check blood pressure  regularly outside of the clinic. Outside blood pressures have been over 140/90. He does not follow a low salt diet.           Hospital Follow-up Visit:    Hospital/Nursing Home/IP Rehab Facility: [facility]  Date of Admission: 2/8/2020  Date of Discharge: 2/11/2020  Reason(s) for Admission: Anemia, shortness of breath, pneumonia            Problems taking medications regularly:  None       Medication changes since discharge: None       Problems adhering to non-medication therapy:  None    Summary of hospitalization:  CareEverywhere information obtained and reviewed  Diagnostic Tests/Treatments reviewed.  Follow up needed: none, just say pulmonology 2/17/2020  Other Healthcare Providers Involved in Patient s Care:         None  Update since discharge: improved.   He was found to have anemia in the hospital.  Post Discharge Medication Reconciliation: discharge medications reconciled and changed, per note/orders (see AVS).  Plan of care communicated with patient     Coding guidelines for this visit:  Type of Medical   Decision Making Face-to-Face Visit       within 7 Days of discharge Face-to-Face Visit        within 14 days of discharge   Moderate Complexity [id number] [id number]   High Complexity 52192 [id number]                Patient Active Problem List   Diagnosis     Paranoid schizophrenia (H)     Monocular diplopia     Seizure disorder (H)     Hepatitis C virus carrier state (H)     Other constipation     Hyperammonemia (H)     Polysubstance dependence (H)     Substance-induced persisting dementia (H)     Paranoid personality (disorder) (H)     Grand mal seizure with intractable epilepsy (H)     Traumatic brain injury with loss of consciousness, sequela (H)     CML (chronic myelocytic  leukemia) (H)     Smoker     Chronic obstructive pulmonary disease, unspecified COPD type (H)     Gastroesophageal reflux disease, esophagitis presence not specified     Frequent falls     Urinary incontinence, unspecified type     Hearing loss, unspecified hearing loss type, unspecified laterality     Impulse control disorder     Bilateral high frequency sensorineural hearing loss     Cervical disc displacement     Charcot's joint of foot, unspecified laterality     Chronic hepatitis C (H)     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, sequela (H)     Ground glass opacity present on imaging of lung     History of foot ulcer     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 No pertinent surgical history.    Social History     Tobacco Use     Smoking status: Current Some Day Smoker     Packs/day: 0.00     Types: Cigarettes     Last attempt to quit: 2019     Years since quittin.4     Smokeless tobacco: Never Used   Substance Use Topics     Alcohol use: Yes     History reviewed. No pertinent family history.      Current Outpatient Medications   Medication Sig Dispense Refill     acetaminophen (MAPAP) 325 MG tablet TAKE TWO  TABLETS (650MG) BY MOUTH EVERY SIX  HOURS AS NEEDED       acetaminophen (TYLENOL) 500 MG tablet Take 1,000 mg by mouth       albuterol (ACCUNEB) 1.25 MG/3ML neb solution NEBULIZE AND INHALE 1 VIAL EVERY 6 HOURS AS NEEDED FOR SHORTNESS OF BREATH /DYSPNEA OR WHEEZING (DX:J44.9) 300 mL 11     albuterol (PROAIR HFA/PROVENTIL HFA/VENTOLIN HFA) 108 (90 Base) MCG/ACT inhaler Inhale 2 puffs into the lungs every 6 hours as needed for shortness of breath / dyspnea or wheezing 1 Inhaler 3     albuterol (PROVENTIL) (2.5 MG/3ML) 0.083% neb solution Take 1 vial (2.5 mg) by nebulization every 6 hours as needed for  shortness of breath / dyspnea or wheezing 1 Box 3     aspirin (ASPIRIN) 81 MG EC tablet Take 1 tablet (81 mg) by mouth daily 90 tablet 3     budesonide-formoterol (SYMBICORT) 80-4.5 MCG/ACT Inhaler Inhale 2 puffs into the lungs daily 3 Inhaler 1     clonazePAM (KLONOPIN) 0.5 MG tablet Take 0.5 mg by mouth 2 times daily        diclofenac (VOLTAREN) 50 MG EC tablet Take 1 tablet (50 mg) by mouth 2 times daily 60 tablet 0     famotidine (PEPCID) 20 MG tablet Take 1 tablet (20 mg) by mouth 2 times daily 180 tablet 4     haloperidol (HALDOL) 10 MG tablet 10 mg At Bedtime        haloperidol decanoate (HALDOL DECANOATE) 100 MG/ML injection Inject 100 mg into the muscle every 30 days        ibuprofen (ADVIL/MOTRIN) 600 MG tablet Take 1 tablet (600 mg) by mouth 3 times daily as needed for moderate pain 60 tablet 0     imatinib (GLEEVEC) 400 MG tablet Take 400 mg by mouth       metoprolol succinate ER (TOPROL-XL) 25 MG 24 hr tablet Take 1 tablet (25 mg) by mouth daily 90 tablet 0     metoprolol succinate ER (TOPROL-XL) 50 MG 24 hr tablet Take 1 tablet (50 mg) by mouth daily 90 tablet 0     nicotine (COMMIT) 2 MG lozenge Place 1 lozenge (2 mg) inside cheek every hour as needed for smoking cessation Take 1 lozenge by mouth as needed, maximum of 10 lozenges per day. 169 lozenge 1     nicotine (NICODERM CQ) 21 MG/24HR 24 hr patch Place 1 patch onto the skin every 24 hours 30 patch 3     omeprazole 20 MG tablet Take 20 mg by mouth       order for DME Equipment being ordered: Nebulizer 1 Device 0     order for DME Equipment being ordered: Wheelchair 1 Device 1     order for DME Equipment being ordered: Adult Briefs 3 Box 11     order for DME Equipment being ordered: [place] belt 1 Device 1     OXcarbazepine (TRILEPTAL) 600 MG tablet Take 600 mg by mouth 2 times daily        phenytoin (DILANTIN) 100 MG capsule Take 100 mg by mouth 2 times daily        polyethylene glycol (MIRALAX/GLYCOLAX) powder Take 1-2 tsp daily as needed 1 Bottle 3  "    QUEtiapine (SEROQUEL) 50 MG tablet Take 50 mg by mouth 4 times daily        QUEtiapine ER (SEROQUEL XR) 300 MG 24 hr tablet Take 300 mg by mouth At Bedtime 2 caps at bedtime       senna (SENNA-TIME) 8.6 MG tablet Take 8.6-17.2 mg by mouth       trihexyphenidyl (ARTANE) 2 MG tablet        venlafaxine (EFFEXOR-XR) 75 MG 24 hr capsule Take 75 mg by mouth daily 3 caps every am       Allergies   Allergen Reactions     Benadryl [Diphenhydramine]      Bupropion      Seizure     Xanax [Alprazolam]      BP Readings from Last 3 Encounters:   02/18/20 (!) 154/70   02/17/20 (!) 163/77   01/29/20 117/65    Wt Readings from Last 3 Encounters:   02/18/20 88.9 kg (196 lb)   02/17/20 88.5 kg (195 lb)   01/29/20 86.2 kg (190 lb)               Blood pressure have been elevated. No chest pain or shortness of breath.  He denies any lightheadedness or dizziness.    Also has a history of chronic hep C would like referral for GI.      Reviewed and updated as needed this visit by Provider  Tobacco  Allergies  Meds  Problems  Med Hx  Surg Hx  Fam Hx         Review of Systems   ROS COMP: Constitutional, HEENT, cardiovascular, pulmonary, gi and gu systems are negative, except as otherwise noted.      Objective    BP (!) 154/70   Pulse 82   Temp 97.3  F (36.3  C) (Oral)   Resp 16   Ht 1.715 m (5' 7.5\")   Wt 88.9 kg (196 lb)   SpO2 97%   BMI 30.24 kg/m    Body mass index is 30.24 kg/m .  Physical Exam   GENERAL: healthy, alert and no distress  NECK: no adenopathy, no asymmetry, masses, or scars and thyroid normal to palpation  RESP: lungs clear to auscultation - no rales, rhonchi or wheezes  CV: regular rate and rhythm, normal S1 S2, no S3 or S4, no murmur, click or rub, no peripheral edema and peripheral pulses strong  ABDOMEN: soft, nontender, no hepatosplenomegaly, no masses and bowel sounds normal  MS: no gross musculoskeletal defects noted, no edema    Diagnostic Test Results:  Labs reviewed in Epic        Assessment & " Plan       ICD-10-CM    1. Pneumonia of both lower lobes due to infectious organism (H) J18.1    2. Hypertension goal BP (blood pressure) < 140/90 I10 metoprolol succinate ER (TOPROL-XL) 50 MG 24 hr tablet   3. Chronic hepatitis C without hepatic coma (H) B18.2 GASTROENTEROLOGY ADULT REF CONSULT ONLY   4. CML (chronic myelocytic leukemia) (H) C92.10 CBC with platelets differential   5. Anemia, unspecified type D64.9 CBC with platelets differential   6. Paranoid schizophrenia (H) F20.0    1.  Symptoms have improved.  We will recheck again in 2 weeks.  2.  We will increase his metoprolol to 50 mg a day.  Warning signs and side effects were discussed.  Recheck in 2 weeks.  3.  Referral was placed to GI at patient's request.  3-4 CBC is pending.  Follow-up depend on results.    Return in about 2 weeks (around 3/3/2020) for recheck.    Andres Rubio PA-C  [facility]

## 2020-02-18 NOTE — LETTER
[facility]  [address] [facility] [address]  Phone: [phone]    February 18, 2020        Espinoza Willoguhby  [address] [address] [facility] [address]          To whom it may concern:    RE: Espinoza Willoughby    Patient was seen and treated today at our clinic. Espinoza Wilolughby is at a fall risk and history of COPD and would benefit from a wheel chair as needed.     Please contact me for questions or concerns.      Sincerely,        Andres Rubio PA-C

## 2020-02-18 NOTE — TELEPHONE ENCOUNTER
"No appointment pending at this time.  Routing to provider to advise.    Larissa BONILLA, RN    Requested Prescriptions   Pending Prescriptions Disp Refills     IBUPROFEN 600 MG PO tablet [Pharmacy Med Name: Ibuprofen 600 MG Tablet]  10     Sig: TAKE 1 TABLET BY MOUTH THREE TIMES DAILY AS NEEDED FOR MODERATE PAIN. *1 TOTAL FILL*       NSAID Medications Failed - 2/17/2020  9:43 AM        Failed - Patient is age 6-64 years        Failed - Normal CBC on file in past 12 months     Recent Labs   Lab Test 02/18/20  1240   WBC 25.1*   RBC 3.60*   HGB 8.5*   HCT 30.1*                Passed - Blood pressure under 140/90 in past 12 months     BP Readings from Last 3 Encounters:   02/18/20 (!) 154/70   02/17/20 (!) 163/77   01/29/20 117/65             Passed - Normal ALT on file in past 12 months     Recent Labs   Lab Test 01/29/20  1259   ALT 14             Passed - Normal AST on file in past 12 months     Recent Labs   Lab Test 01/29/20  1259   AST 18             Passed - Recent (12 mo) or future (30 days) visit within the authorizing provider's specialty     Patient has had an office visit with the authorizing provider or a provider within the authorizing providers department within the previous 12 mos or has a future within next 30 days. See \"Patient Info\" tab in inbasket, or \"Choose Columns\" in Meds & Orders section of the refill encounter.            Passed - Medication is active on med list        Passed - Normal serum creatinine on file in past 12 months     Recent Labs   Lab Test 01/29/20  1259   CR 0.84             "

## 2020-02-19 NOTE — TELEPHONE ENCOUNTER
Please clarify with his staff if he is using Ibuprofen or naproxen for pain and how often?    Thanks    Andres Rubio PA-C

## 2020-02-19 NOTE — TELEPHONE ENCOUNTER
"No appointment pending at this time.  Routing to provider to advise.    Larissa BONILLA, RN    Requested Prescriptions   Pending Prescriptions Disp Refills     NAPROXEN 500 MG PO tablet [Pharmacy Med Name: Naproxen 500 MG Tablet]  11     Sig: TAKE 1 TABLET BY MOUTH TWICE DAILY WITH MEALS       NSAID Medications Failed - 2/19/2020  7:20 AM        Failed - Blood pressure under 140/90 in past 12 months     BP Readings from Last 3 Encounters:   02/18/20 (!) 154/70   02/17/20 (!) 163/77   01/29/20 117/65                 Failed - Patient is age 6-64 years        Failed - Normal CBC on file in past 12 months     Recent Labs   Lab Test 02/18/20  1240   WBC 25.1*   RBC 3.60*   HGB 8.5*   HCT 30.1*                    Failed - Medication is active on med list        Passed - Normal ALT on file in past 12 months     Recent Labs   Lab Test 01/29/20  1259   ALT 14             Passed - Normal AST on file in past 12 months     Recent Labs   Lab Test 01/29/20  1259   AST 18             Passed - Recent (12 mo) or future (30 days) visit within the authorizing provider's specialty     Patient has had an office visit with the authorizing provider or a provider within the authorizing providers department within the previous 12 mos or has a future within next 30 days. See \"Patient Info\" tab in inbasket, or \"Choose Columns\" in Meds & Orders section of the refill encounter.              Passed - Normal serum creatinine on file in past 12 months     Recent Labs   Lab Test 01/29/20  1259   CR 0.84               "

## 2020-02-19 NOTE — TELEPHONE ENCOUNTER
Per Nuha at group home:  Patient does not use naproxen at all; states too many side effects.  He takes the ibuprofen one tab about 3x/day for his pain.    To Andres Rubio PA-C for refill need

## 2020-02-24 NOTE — TELEPHONE ENCOUNTER
"BP Readings from Last 3 Encounters:   02/18/20 (!) 154/70   02/17/20 (!) 163/77   01/29/20 117/65     Requested Prescriptions   Pending Prescriptions Disp Refills     nicotine (COMMIT) 2 MG lozenge [Pharmacy Med Name: Nicotine Polacrilex 2 MG Lozenge]  11     Sig: PLACE 1 LOZENGE INSIDE THE CHEEK EVERY HOUR AS NEEDED FOR SMOKING CESSATION. **MAX OF 10 PER DAY*       Partial Cholinergic Nicotinic Agonist Agents Failed - 2/21/2020  4:25 PM        Failed - Blood pressure under 140/90 in past 12 months     BP Readings from Last 3 Encounters:   02/18/20 (!) 154/70   02/17/20 (!) 163/77   01/29/20 117/65           Passed - Recent (12 mo) or future (30 days) visit within the authorizing provider's specialty     Patient has had an office visit with the authorizing provider or a provider within the authorizing providers department within the previous 12 mos or has a future within next 30 days. See \"Patient Info\" tab in inbasket, or \"Choose Columns\" in Meds & Orders section of the refill encounter.            Passed - Medication is active on med list        Passed - Patient is 18 years of age or older          "

## 2020-02-27 NOTE — TELEPHONE ENCOUNTER
Jyoti states they are waiting for the order to be sent to [facility] for patient's wheelchair and she would also like an oxygen concentrator ordered because he can't use the oxygen tank.    Thank you.

## 2020-02-27 NOTE — TELEPHONE ENCOUNTER
"No appointment pending at this time.  Routing to provider to advise.    Larissa SUEN, RN    Requested Prescriptions   Pending Prescriptions Disp Refills     diclofenac (VOLTAREN) 50 MG EC tablet [Pharmacy Med Name: Diclofenac Sodium 50 MG Tablet delayed release]  11     Sig: TAKE 1 TABLET BY MOUTH TWICE DAILY *1 TOTAL FILL*       NSAID Medications Failed - 2/27/2020  2:54 PM        Failed - Blood pressure under 140/90 in past 12 months     BP Readings from Last 3 Encounters:   02/18/20 (!) 154/70   02/17/20 (!) 163/77   01/29/20 117/65                 Failed - Patient is age 6-64 years        Failed - Normal CBC on file in past 12 months     Recent Labs   Lab Test 02/18/20  1240   WBC 25.1*   RBC 3.60*   HGB 8.5*   HCT 30.1*                    Passed - Normal ALT on file in past 12 months     Recent Labs   Lab Test 01/29/20  1259   ALT 14             Passed - Normal AST on file in past 12 months     Recent Labs   Lab Test 01/29/20  1259   AST 18             Passed - Recent (12 mo) or future (30 days) visit within the authorizing provider's specialty     Patient has had an office visit with the authorizing provider or a provider within the authorizing providers department within the previous 12 mos or has a future within next 30 days. See \"Patient Info\" tab in inbasket, or \"Choose Columns\" in Meds & Orders section of the refill encounter.              Passed - Medication is active on med list        Passed - Normal serum creatinine on file in past 12 months     Recent Labs   Lab Test 01/29/20  1259   CR 0.84               "

## 2020-02-28 NOTE — TELEPHONE ENCOUNTER
RN returned call to [facility] at [facility]. [facility] states pt is a new client to [facility]   [facility] states generally these orders come from Pulmonologist and requests that if PCP will not be managing pt's oxygen and associated testing to determine flow rates, please route to pt's [facility] Pulmonologist to address and return call to [facility] with requested orders and test results.    Last office visit with PCP 2/18/20   Last office visit with Pulmonology 2/17/20    Per chart review, oxygen concentrator was ordered on 2/27/20, as a result of request from Jyoti from [facility] stating an oxygen concentrator is needed because pt can't use the oxygen tank.  order for DME 1 Device 0 2/27/2020  --   Sig: Equipment being ordered: Oxygen Concentrator     1. [facility] states order needs to specify portable concentrator or stationary concentrator.  2. [facility] states order needs to specify the liter flow rate for the oxygen concentrator. Pulmonary test results supporting the liter flow rate must be provided.    Joleen Fernandez, LINETTEN, RN

## 2020-02-28 NOTE — TELEPHONE ENCOUNTER
Need clarification on order for oxygen, is this for stationary or portable.Also need copy of test results that determined which type  faxed to [phone]

## 2020-03-02 NOTE — TELEPHONE ENCOUNTER
Lucina states that:  1.   His symbicort used to be scheduled as twice a day but they noticed that this prescription is only once daily.  He has been taking it twice daily.  Please advise if can increase the dosing on the prescription to two puffs bid.  Will need new prescription sent to his pharmacy.  Uses for his COPD.  2.  He is also refusing to use the albuterol neb solution or the inhaler; he has seen commercials for the budesomide neb solution and is wanting to use that instead of the albuterol inhaler or neb solution.    Lucina states has tried to educate patient on how the albuterol (rescue) inhaler and neb solution is used when needed for his shortness of breath but he doesn't seem to understand and since seeing TV commercials only wants the symbicort inhaler bid and the budesomide neb solution.  Please advise; will need new prescription's sent to his pharmacy with notation to discontinue the albuterol neb solution if he's supposed to.  Kirstin Ngo RN  Please note:  His current symbicort inhaler prescription is 80-4.5 two puffs daily.  Do you want to continue this dosing or increase to the 160-4.5 2 puffs bid?      [facility] pharmacy.

## 2020-03-02 NOTE — TELEPHONE ENCOUNTER
Lucina from [facility] is calling stating that the rx for budesonide-formoterol (SYMBICORT) 80-4.5 MCG/ACT Inhaler is 1 time per day.  However Espinoza would like it more often.  Please call to advise.  Thank you

## 2020-03-04 NOTE — TELEPHONE ENCOUNTER
LEA for patient's nurse to call back.     Nicole Ramirez RN   [facility] Care Coordinator  [facility]

## 2020-03-10 PROBLEM — Z91.81 AT RISK FOR FALLING: Status: ACTIVE | Noted: 2020-01-01

## 2020-03-10 PROBLEM — I10 HYPERTENSION GOAL BP (BLOOD PRESSURE) < 140/90: Status: ACTIVE | Noted: 2020-01-01

## 2020-03-10 NOTE — LETTER
" My COPD Action Plan     Name: Espinoza Willoughby    YOB: 1950   Date: 3/10/2020    My doctor: Andres Rubio PA-C   [facility] [place] [address] [facility] [address] [phone]  My Controller Medicine: { :371339}   Dose: ***     My Rescue Medicine: { :030292}   Dose: ***     My Flare Up Medicine: { :531165}   Dose: ***     My COPD Severity: { :065845}      Use of Oxygen: { :979476::\"Oxygen Not Prescribed \"}     Make sure you've had your pneumonia   vaccines.          GREEN ZONE       Doing well today      Usual level of activity and exercise    Usual amount of cough and mucus    No shortness of breath    Usual level of health (thinking clearly, sleeping well, feel like eating) Actions:      Take daily medicines    Use oxygen as prescribed    Follow regular exercise and diet plan    Avoid cigarette smoke and other irritants that harm the lungs           YELLOW ZONE          Having a bad day or flare up      Short of breath more than usual    A lot more sputum (mucus) than usual    Sputum looks yellow, green, tan, brown or bloody    More coughing or wheezing    Fever or chills    Less energy; trouble completing activities    Trouble thinking or focusing    Using quick relief inhaler or nebulizer more often    Poor sleep; symptoms wake me up    Do not feel like eating Actions:      Get plenty of rest    Take daily medicines    Use quick relief inhaler every *** hours    If you use oxygen, call you doctor to see if you should adjust your oxygen    Do breathing exercises or other things to help you relax    Let a loved one, friend or neighbor know you are feeling worse    Call your care team if you have 2 or more symptoms.  Start taking steroids or antibiotics if directed by your care team           RED ZONE       Need medical care now      Severe shortness of breath (feel you can't breathe)    Fever, chills    Not enough breath to do any activity    Trouble " coughing up mucus, walking or talking    Blood in mucus    Frequent coughing   Rescue medicines are not working    Not able to sleep because of breathing    Feel confused or drowsy    Chest pain    Actions:      Call your health care team.  If you cannot reach your care team, call 911 or go to the emergency room.        Annual Reminders:  Meet with Care Team, Flu Shot every Fall  Pharmacy: [facility], [facility]. - [place], [place] - [address]

## 2020-03-10 NOTE — TELEPHONE ENCOUNTER
Fax to [place],   [phone]    Reason for Call:  Other pt did not get the After Visit summary for the visit.     Detailed comments: Would like it ot be faxed to the home at [phone]    Phone Number Patient can be reached at: Cell number on file: [phone]    Best Time: Any    Can we leave a detailed message on this number? YES    Call taken on 3/10/2020 at 1:47 PM by Nikki Gamble

## 2020-03-10 NOTE — PROGRESS NOTES
Subjective     Espinoza Willoughby is a 69 year old male who presents to clinic today for the following health issues:    HPI   Pt here following up on elevated bp readings and changed of Metoprolol, still getting elevated numbers per pt -he is here with a caregiver today she is unclear what his blood pressure readings have been on.  Discuss changing Albuterol nebulizer to Budesonide-talk to patient about this he is already on Symbicort.  He has a pulmonologist but is unclear if he has been doing regular follow-ups regarding his COPD.  He has been recently seen for his lung nodules.  He had questions about his oxygen tank and getting a portable 1.    Needs clarification on how often he should be taking his Symbicort-   Physical therapy referral for legs due to off balance walking-is unclear if he is actually fallen or not.    Refill on Ferrous sulfate -       Patient Active Problem List   Diagnosis     Paranoid schizophrenia (H)     Monocular diplopia     Seizure disorder (H)     Hepatitis C virus carrier state (H)     Other constipation     Hyperammonemia (H)     Polysubstance dependence (H)     Substance-induced persisting dementia (H)     Paranoid personality (disorder) (H)     Grand mal seizure with intractable epilepsy (H)     Traumatic brain injury with loss of consciousness, sequela (H)     CML (chronic myelocytic leukemia) (H)     Smoker     Chronic obstructive pulmonary disease, unspecified COPD type (H)     Gastroesophageal reflux disease, esophagitis presence not specified     Frequent falls     Urinary incontinence, unspecified type     Hearing loss, unspecified hearing loss type, unspecified laterality     Impulse control disorder     Bilateral high frequency sensorineural hearing loss     Cervical disc displacement     Charcot's joint of foot, unspecified laterality     Chronic hepatitis C (H)     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, sequela (H)     Ground glass opacity present on imaging of  lung     History of foot ulcer     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 No pertinent surgical history.    Social History     Tobacco Use     Smoking status: Former Smoker     Packs/day: 0.00     Types: Cigarettes     Last attempt to quit: 2019     Years since quittin.5     Smokeless tobacco: Never Used   Substance Use Topics     Alcohol use: Yes     History reviewed. No pertinent family history.      Current Outpatient Medications   Medication Sig Dispense Refill     acetaminophen (TYLENOL) 500 MG tablet Take 1,000 mg by mouth 2 tabs every 6 hours as needed       albuterol (ACCUNEB) 1.25 MG/3ML neb solution NEBULIZE AND INHALE 1 VIAL EVERY 6 HOURS AS NEEDED FOR SHORTNESS OF BREATH /DYSPNEA OR WHEEZING (DX:J44.9) 300 mL 11     albuterol (PROAIR HFA/PROVENTIL HFA/VENTOLIN HFA) 108 (90 Base) MCG/ACT inhaler Inhale 2 puffs into the lungs every 6 hours as needed for shortness of breath / dyspnea or wheezing 1 Inhaler 3     aspirin (ASPIRIN) 81 MG EC tablet Take 1 tablet (81 mg) by mouth daily 90 tablet 3     benzonatate (TESSALON) 100 MG capsule Take 100 mg by mouth 3 times daily as needed for cough       budesonide-formoterol (SYMBICORT) 160-4.5 MCG/ACT Inhaler Inhale 2 puffs into the lungs 2 times daily 1 Inhaler 3     clonazePAM (KLONOPIN) 0.5 MG tablet Take 0.5 mg by mouth 2 times daily        diclofenac (VOLTAREN) 50 MG EC tablet Take 1 tablet (50 mg) by mouth 2 times daily 60 tablet 0     famotidine (PEPCID) 20 MG tablet Take 1 tablet (20 mg) by mouth 2 times daily 180 tablet 4     ferrous sulfate (FE TABS) 325 (65 Fe) MG EC tablet Take 325 mg by mouth daily       ferrous sulfate (FEROSUL) 325 (65 Fe) MG tablet Take 1 tablet (325 mg) by mouth  daily (with breakfast) 90 tablet 3     haloperidol (HALDOL) 10 MG tablet 10 mg At Bedtime        haloperidol decanoate (HALDOL DECANOATE) 100 MG/ML injection Inject 100 mg into the muscle every 30 days        IBUPROFEN 600 MG PO tablet TAKE 1 TABLET BY MOUTH THREE TIMES DAILY AS NEEDED FOR MODERATE PAIN. *1 TOTAL FILL* 90 tablet 3     melatonin 5 MG CAPS Prn at bedtime       metoprolol succinate ER (TOPROL-XL) 25 MG 24 hr tablet Take 1 tablet (25 mg) by mouth daily 90 tablet 0     metoprolol succinate ER (TOPROL-XL) 50 MG 24 hr tablet Take 1 tablet (50 mg) by mouth daily 90 tablet 0     nicotine (COMMIT) 2 MG lozenge PLACE 1 LOZENGE INSIDE THE CHEEK EVERY HOUR AS NEEDED FOR SMOKING CESSATION. **MAX OF 10 PER DAY* 144 lozenge 11     nicotine (NICODERM CQ) 21 MG/24HR 24 hr patch Place 1 patch onto the skin every 24 hours 30 patch 3     omeprazole 20 MG tablet Take 20 mg by mouth 2 times daily        order for DME Equipment being ordered: Wheelchair 1 Device 0     order for DME Equipment being ordered: Oxygen Concentrator 1 Device 0     order for DME Equipment being ordered: Nebulizer 1 Device 0     order for DME Equipment being ordered: Adult Briefs 3 Box 11     order for DME Equipment being ordered: Tripp belt 1 Device 1     OXcarbazepine (TRILEPTAL) 600 MG tablet Take 600 mg by mouth 2 times daily        phenytoin (DILANTIN) 100 MG capsule Take 100 mg by mouth 2 times daily        polyethylene glycol (MIRALAX/GLYCOLAX) powder Take 1-2 tsp daily as needed 1 Bottle 3     QUEtiapine (SEROQUEL) 50 MG tablet Take 50 mg by mouth 4 times daily        QUEtiapine ER (SEROQUEL XR) 300 MG 24 hr tablet Take 300 mg by mouth At Bedtime 2 caps at bedtime       senna (SENNA-TIME) 8.6 MG tablet Take 8.6-17.2 mg by mouth       trihexyphenidyl (ARTANE) 2 MG tablet        venlafaxine (EFFEXOR-XR) 75 MG 24 hr capsule Take 75 mg by mouth daily 3 caps every am       Allergies   Allergen Reactions     Benadryl [Diphenhydramine]       "Bupropion      Seizure     Xanax [Alprazolam]      Recent Labs   Lab Test 02/12/20 01/29/20  1259   ALT  --  14   CR  --  0.84   GFRESTIMATED  --  89   GFRESTBLACK  --  >90   POTASSIUM 3.8 3.8      BP Readings from Last 3 Encounters:   03/10/20 (!) 144/74   02/18/20 (!) 154/70   02/17/20 (!) 163/77    Wt Readings from Last 3 Encounters:   03/10/20 84.8 kg (187 lb)   02/18/20 88.9 kg (196 lb)   02/17/20 88.5 kg (195 lb)           Reviewed and updated as needed this visit by Provider  Tobacco  Allergies  Meds  Problems  Med Hx  Surg Hx  Fam Hx         Review of Systems   ROS COMP: Constitutional, HEENT, cardiovascular, pulmonary, gi and gu systems are negative, except as otherwise noted.      Objective    BP (!) 144/74   Pulse 71   Resp 18   Ht 1.715 m (5' 7.5\")   Wt 84.8 kg (187 lb)   SpO2 96%   BMI 28.86 kg/m    Body mass index is 28.86 kg/m .  Physical Exam   GENERAL: healthy, alert and no distress  NECK: no adenopathy, no asymmetry, masses, or scars and thyroid normal to palpation  RESP: lungs clear to auscultation - no rales, rhonchi or wheezes  CV: regular rate and rhythm, normal S1 S2, no S3 or S4, no murmur, click or rub, no peripheral edema and peripheral pulses strong  MS: no gross musculoskeletal defects noted, no edema  He is unsteady from getting out of a chair and taking the first few steps.  He had to grab the wall to keep himself from falling.    Diagnostic Test Results:  Labs reviewed in Epic        Assessment & Plan       ICD-10-CM    1. Chronic obstructive pulmonary disease, unspecified COPD type (H)  J44.9 budesonide-formoterol (SYMBICORT) 160-4.5 MCG/ACT Inhaler     PULMONARY MEDICINE REFERRAL   2. CML (chronic myelocytic leukemia) (H)  C92.10 ferrous sulfate (FEROSUL) 325 (65 Fe) MG tablet   3. Hypertension goal BP (blood pressure) < 140/90  I10 metoprolol succinate ER (TOPROL-XL) 25 MG 24 hr tablet   4. At risk for falling  Z91.81 [facility] REFERRAL   1.  We " will increase his Symbicort 260/4.5 mcg 2 puffs twice a day.  Encouraged him to follow-up with pulmonologist for continued care and questions about his oxygen use.  2.  Okay for refills of his ferrous sulfate.  3.  We will increase his metoprolol to 75 mg a day.  Recheck his blood pressure in 2 weeks.  4.  A referral was placed for physical therapy for gait analysis.    Return in about 2 weeks (around 3/24/2020) for BP Recheck.    Andres Rubio PA-C  [facility]

## 2020-03-11 NOTE — TELEPHONE ENCOUNTER
Prior Authorization Retail Medication Request    Medication/Dose: budesonide-formoterol (SYMBICORT) 160-4.5 MCG/ACT Inhaler   ICD code (if different than what is on RX):  Chronic obstructive pulmonary disease, unspecified COPD type (H) [J44.9]  - Primary   Previously Tried and Failed:    Rationale:      Insurance Name:  Medicare/ Medicaid  Insurance ID:  [id number] / [id number]      Pharmacy Information (if different than what is on RX)  Name:  [facility]  Phone:  [phone]

## 2020-03-11 NOTE — TELEPHONE ENCOUNTER
Andres Rubio PA-C received form for wheelchair rental/cushion in his inbasket.  This form needs to be completed. Prior form found under media tab date 11/7/19 but for different wheelchair.  :;  Please copy information onto the new form and have Andres Rubio PA-C sign so can be faxed.  Thank you.  Kirstin CALDERON

## 2020-03-12 NOTE — TELEPHONE ENCOUNTER
I copied the information onto the new forms and I placed the forms in your bin for review/sign.  Tabby Mello,

## 2020-03-13 NOTE — TELEPHONE ENCOUNTER
Prior Authorization Not Needed per Insurance    Medication: budesonide-formoterol (SYMBICORT) 160-4.5 MCG/ACT Inhaler   Insurance Company: [employer] - Phone [phone] Fax [phone]  Expected CoPay:      Pharmacy Filling the Rx: [facility] - [place], [place] - [address] [facility]  Pharmacy Notified:  Yes  Patient Notified:  Yes

## 2020-03-16 NOTE — TELEPHONE ENCOUNTER
Name from pharmacy: Diclofenac Sodium 50 MG Tablet delayed release           Will file in chart as: diclofenac (VOLTAREN) 50 MG EC tablet          Sig: TAKE 1 TABLET BY MOUTH TWICE DAILY *1 TOTAL FILL*     Disp:  60 tablet (Pharmacy requested: 60 Not Specified)    Refills:  11     Start: 3/13/2020     Class: E-Prescribe     Non-formulary  For: Pain in both lower extremities     Last ordered: 1 month ago by Andres Rubio PA-C  Last refill: 2/4/2020     Rx #: [id number]     NSAID Medications Rirkgy9803/16/2020 01:23 PM   Blood pressure under 140/90 in past 12 months Protocol Details    Patient is age 6-64 years     Normal CBC on file in past 12 months         Last OV Andres Rubio PA-C was 3/10/20

## 2020-03-29 NOTE — TELEPHONE ENCOUNTER
Ju Ramos 0732 at bedside at [facility]. CRNA 0732 at bedside at  [facility] pt to sitting up on bedside at 0734. Timeout completed at 4385 with MD, JUVE and myself at bedside. Test dose given at 0742. Negative reaction. Pt assisted to lying back in left tilt position. See anesthesia record for details. See vital sign flow sheet for BP. Tolerated procedure well. Patient has been using propanolol 10 mg, 3xs daily when his systolic was greater than 155. Does he need to be seen to continue this? His b/p is checked 2xs daily. He has used this med 3xs this month. Please advise.   Ok to leave a detailed message.

## 2020-04-03 NOTE — TELEPHONE ENCOUNTER
[facility] Prescription dated 02/28/2020 updated by Ayde and re-faxed with (Face to Face) OV dates of 12/4/2019, 1/15/2020 & 3/10/2020 included for substantiating requirements per request. Faxed to # [phone].  JAN Gonzales

## 2020-04-08 NOTE — TELEPHONE ENCOUNTER
I called and spoke to staff member who said she was not the one that called.  Pt was next to staff member and I could here him respond to her.  She asked him if he had a cough and he said yes, it stated yesterday.  He denied a fever or sob. He said he does not want to come to the clinic.  I asked if he wanted to talk to the provider and he said he did not.  Nothing else needed.  Kimberly SUEN, RN

## 2020-04-08 NOTE — TELEPHONE ENCOUNTER
Reason for call:  Other   Patient called regarding (reason for call): call back    Additional comments: per gen from the group home . She states that the patient is sweating and has a cough and not sleeping as much. Per the patient wanted to follow up with provider in regards to symptoms     Phone number to reach patient:  Other phone number:  [phone]  (ask gen)     Best Time:  anytime    Can we leave a detailed message on this number?  NO    Travel screening: Not Applicable

## 2020-04-10 NOTE — TELEPHONE ENCOUNTER
BP Readings from Last 6 Encounters:   03/10/20 (!) 144/74   02/18/20 (!) 154/70   02/17/20 (!) 163/77   01/29/20 117/65   01/15/20 119/62   12/11/19 126/65     Routing refill request to provider for review/approval because:  Labs out of range:  As above.  Fiona Akins RN

## 2020-04-16 NOTE — PROGRESS NOTES
"Date: 2020 12:28:20  Clinician: Constantin Ballard  Clinician NPI: [id number]  Patient: Espinoza Willoughby  Patient : 1950  Patient Address: [address] [place] [address], [place] [address]  Patient Phone: [phone]  Visit Protocol: URI  Patient Summary:  Espinoza is a 69 year old ( : 1950 ) male who initiated a Visit for cold, sinus infection, or influenza. When asked the question \"Please sign me up to receive news, health information and promotions. \", Espinoza responded \"No\".    Espinoza states his symptoms started gradually 2-3 weeks ago.   His symptoms consist of rhinitis, a headache, nasal congestion, and malaise. He is experiencing mild difficulty breathing with activities but can speak normally in full sentences.   Symptom details     Nasal secretions: The color of his mucus is clear.    Headache: He states the headache is mild (1-3 on a 10 point pain scale).      Espinoza denies having enlarged lymph nodes, chills, diarrhea, facial pain or pressure, myalgias, vomiting, nausea, teeth pain, wheezing, sore throat, cough, ear pain, and fever. He also denies taking antibiotic medication for the symptoms, double sickening (worsening symptoms after initial improvement), and having recent facial or sinus surgery in the past 60 days.    Pertinent COVID-19 (Coronavirus) information  Espinoza has not traveled internationally or to the areas where COVID-19 (Coronavirus) is widespread, including cruise ship travel in the last 14 days before the start of his symptoms.   Espinoza does not work or volunteer as healthcare worker or a  and does not work or volunteer in a healthcare facility.   He does not live with a healthcare worker.   Espinoza has not had a close contact with a laboratory-confirmed COVID-19 patient within 14 days of symptom onset. He also has not had a close contact with a suspected COVID-19 patient within 14 days of symptom onset.   Pertinent medical history  Espinoza does not need a return to " work/school note.   Weight: 182 lbs   Espinoza does not smoke or use smokeless tobacco.   Additional information as reported by the patient (free text): History of pneumonia/bronchitis and COPD. House nurse listened to lungs and heard crackling in lower lobes and wheezing in upper lobes. O2 = 94/95%, T = 98.1, R = 16. Has had and C/O heavy sweating on and off, but does not feel feverish.   Weight: 182 lbs    MEDICATIONS: nicotine transdermal, venlafaxine oral, metoprolol succinate oral, ferrous sulfate oral, famotidine oral, omeprazole oral, diclofenac-misoprostol oral, clonazepam oral, oxcarbazepine oral, phenytoin sodium extended oral, Symbicort inhalation, aspirin oral, haloperidol oral, quetiapine oral, haloperidol decanoate intramuscular, senna oral, Ventolin HFA inhalation, acetaminophen oral, ipratropium-albuterol inhalation, benzonatate oral, ibuprofen oral, melatonin oral, nicotine (polacrilex) buccal, polyethylene glycol 3350 oral, ALLERGIES: Wellbutrin, Xanax, Benadryl  Clinician Response:  Dear Espinoza,    Based on your current exam concern is for a potential COPD exacerbation. I have sent in a prescription for omnicef to be taken twice a day for 10 days.&nbsp;  Your symptoms show that you may have coronavirus (COVID-19). This illness can cause fever, cough and trouble breathing. Many people get a mild case and get better on their own. Some people can get very sick.  Will I be tested for COVID-19?  Because the virus is spreading, we are no longer testing most patients. You may request testing if:   You are very ill. For example, you're on chemotherapy, dialysis or home hospice care. (Contact your specialty clinic or program.)   You live in a nursing home or other long-term care facility. (Talk to your nurse manager or medical director.)   You're a health care worker. (Contact your employee health office.)   How can I protect others?  Without a test, we can't know for sure that you have COVID-19. For safety,  it's very important to follow these rules.  First, stay home and away from others (self-isolate) until:   You've had no fever---and no medicine that reduces fever---for 3 full days (72 hours). And...    Your other symptoms have gotten better. For example, your cough or breathing has improved. And...   At least 7 days have passed since your symptoms started.   During this time:   Don't go to work, school or anywhere else.    Stay away from others in your home. No hugging, kissing or shaking hands.   Don't let anyone visit.   Cover your mouth and nose with a mask, tissue or wash cloth to avoid spreading germs.   Wash your hands and face often. Use soap and water.   How can I take care of myself?   1.Take Tylenol (acetaminophen) for fever or pain. If you have liver or kidney problems, ask your family doctor if it's okay to take Tylenol.        Adults can take either:    650 mg (two 325 mg pills) every 4 to 6 hours, or...   1,000 mg (two 500 mg pills) every 8 hours as needed.    Note: Don't take more than 3,000 mg in one day.   For children, check the Tylenol bottle for the right dose. The dose is based on the child's age or weight.   2.If you have other health problems (like cancer, heart failure, an organ transplant or severe kidney disease): Call your specialty clinic if you don't feel better in the next 2 days.       3.Know when to call 911: If your breathing is so bad that it keeps you from doing normal activities, call 911 or go to the emergency room. Tell them that you've been staying home and may have COVID-19.       4.Sign up for [employer]. We know it's scary to hear that you might have COVID-19. We want to track your symptoms to make sure you're okay over the next 2 weeks. Please look for an email from [employer]---this is a free, online program that we'll use to keep in touch. To sign up, follow the link in the email. Learn more at [email or web address].   Where can I get more information?   To learn more about COVID-19 and how to care for yourself at home, please visit the CDC website at https://www.cdc.gov/coronavirus/2019-ncov/about/steps-when-sick.html.  For more options for care at [facility], please visit our website at [email or web address]         Diagnosis: Chronic obstructive pulmonary disease with acute lower respiratory infection  Diagnosis ICD: J44.0  Prescription: cefdinir 300 mg oral capsule 20 capsule, 10 days supply. Take 1 capsule by mouth every 12 hours for 10 days. Refills: 0, Refill as needed: no, Allow substitutions: yes  Pharmacy: [facility] - [phone] [facility] [address] [facility] [place] [address]

## 2020-04-24 NOTE — TELEPHONE ENCOUNTER
Provider:  Is it appropriate to give the home care agency a discontinuation order for the naproxen?  Thank you. Karen Pandey R.N.      Per OV note dated 1/29/2020 from  Andres Rubio PA-C is as follows:   1. Hypertension goal BP (blood pressure) < 140/90 I10 metoprolol succinate ER (TOPROL-XL) 25 MG 24 hr tablet   2. Pain in both lower extremities M79.604 CBC with platelets differential     M79.605 Comprehensive metabolic panel       naproxen (NAPROSYN) 500 MG tablet   1. Will stop propranolol and start metoprolo. 25mg daily. warning signs discussed. side effects discussed  Recheck 2 wks.   2. Unclear etiology: labs pending. Ok for naproxen twice a day as needed.      Return in about 2 weeks (around 2/12/2020) for recheck.     Andres Rubio PA-C  [facility]

## 2020-04-24 NOTE — TELEPHONE ENCOUNTER
Reason for Call:  Other call back    Detailed comments: home care is calling stating couple months ago pcp prescribed RX: naproxen, patient doesn't take it and no refills, wondering if pcp can send discontinue order to [facility] FAX: [phone]. Thank you.    Phone Number Patient can be reached at: [phone]    Best Time:     Can we leave a detailed message on this number? YES    Call taken on 4/24/2020 at 9:55 AM by Bhavna Rush

## 2020-04-27 NOTE — TELEPHONE ENCOUNTER
"Contacted patient regarding upcoming pulmonary follow up.     Patient is scheduled for pulmonary follow up Monday 05- in [place] with Dr. Hooks. D/t YAHAIRA 19 situation, office visit must be converted to virtual video/telephone visit. Pulmonary video/telephone visit must be moved to 05- in any open spot to consolidate pulmonary clinic. PFTs must be cancelled.    Patient should complete scheduled chest CT on Monday 05-.   Spoke with patient's home health RN Lucina and explained pulmonary plans. Code word given to Lucina in order to have pt escorted by home care nurse for CT appointment.Code word is \"spring\".     Lucina requesting telephone visit. Virtual video visit declined. PFT's cancelled. Pulmonary visit has been converted into telephone visit. Telephone visit has been rescheduled to 05- @ 10am.      Leonardo Medina LPN    Rheumatology / Pulmonology  [facility]  "

## 2020-05-07 NOTE — TELEPHONE ENCOUNTER
See message from 2 days ago from patient's pulmonologist.   Patient had PFTs scheduled for today and pending appointment with patients pulmonologist, Dr Hooks on 5/11/20.    Larissa Cast BSN, RN

## 2020-05-07 NOTE — TELEPHONE ENCOUNTER
Lucina CALDERON, from [facility], patient is requesting a portable oxygen tank. RN is not sure how to go about this. Patient does have COPD, and has a Nebulizer. Does not currently have a regular oxygen tank. Patient states if he has a portable oxygen tank he will be able to exercise amongst other things. Please advise.  Lucina CALDERON phone number [phone]  JAN Pal

## 2020-05-11 NOTE — PROGRESS NOTES
"Espinoza Willoughby is a 69 year old male who is being evaluated via a billable telephone visit.      The patient has been notified of following:     \"This telephone visit will be conducted via a call between you and your physician/provider. We have found that certain health care needs can be provided without the need for a physical exam.  This service lets us provide the care you need with a short phone conversation.  If a prescription is necessary we can send it directly to your pharmacy.  If lab work is needed we can place an order for that and you can then stop by our lab to have the test done at a later time.    Telephone visits are billed at different rates depending on your insurance coverage. During this emergency period, for some insurers they may be billed the same as an in-person visit.  Please reach out to your insurance provider with any questions.    If during the course of the call the physician/provider feels a telephone visit is not appropriate, you will not be charged for this service.\"    Patient has given verbal consent for Telephone visit?  Yes    What phone number would you like to be contacted at? [phone]    How would you like to obtain your AVS? Mail a copy     Leonardo Medina LPN    Rheumatology / Pulmonology  [facility]      Phone call duration: 25 minutes          "

## 2020-05-11 NOTE — TELEPHONE ENCOUNTER
Contacted Lucina, home care RN. Lucina did not have patient's medication information available. Lucina requesting a call back after Dr. Hooks's telephone visit to review patient medications. Lucina's # is [phone].    Leonardo Medina LPN    Rheumatology / Pulmonology  [facility]

## 2020-05-11 NOTE — TELEPHONE ENCOUNTER
Spoke with Lucina home care RN. Patient's medications have been reviewed and updated.    Leonardo Medina LPN    Rheumatology / Pulmonology  [facility]

## 2020-05-11 NOTE — TELEPHONE ENCOUNTER
REM faxed in the patient's Blood Pressure readings for April & May.  Three questions are listed on the cover sheet. I have placed the Faxes in your basket for review. Please place in the TC's basket with any directions or when completed.  Radha Lyman TC

## 2020-05-11 NOTE — PROGRESS NOTES
"LUNG NODULE & INTERVENTIONAL PULMONARY CLINIC  CL[facility]ICS [facility], [facility], [facility]   VIRTUAL TELEPHONE VISIT    Espinoza Willoughby MRN# [id number]   Age: 69 year old YOB: 1950     Reason for Consultation: lung mass     Requesting Physician: Andres Rubio PA-C  [id number] [place] [address]    Espinoza Willoughby is a 69 year old male who is being evaluated via a billable telephone visit.      The patient has been notified of following: \"This telephone visit will be conducted via a call between you and your physician/provider. We have found that certain health care needs can be provided without the need for a physical exam.  This service lets us provide the care you need with a short phone conversation.  If a prescription is necessary we can send it directly to your pharmacy.  If lab work is needed we can place an order for that and you can then stop by our lab to have the test done at a later time. Telephone visits are billed at different rates depending on your insurance coverage. During this emergency period, for some insurers they may be billed the same as an in-person visit.  Please reach out to your insurance provider with any questions. If during the course of the call the physician/provider feels a telephone visit is not appropriate, you will not be charged for this service.\"    Patient has given verbal consent for Telephone visit?  Yes    How would you like to obtain your AVS? Spotigohart    Phone call duration: 25 minutes    Additional provider notes: see below     Assessment and Plan:    1. Established multiple pulmonary lung nodule(s). Given the characteristics on current/previous imaging and risk factors; I would classify this to be High (>90%) risk for cancer. Culprit nodule is in MICK which has shown interval growth in addition to enlarged paratracheal and subcarinal LN's. We discussed that bx would be necessary however he refused at this " time. I discussed that his disease can certainly progress with a waiting approach. He refused to talk about his nodules or potential cancer diagnosis however his home nurse aid relayed that he acknowledged this potential risk/harm. I will have my MG RN followup up with him in 2 days to [place] back on his decision. Otherwise we will plan to get PET-CT in 3mo.      2. COPD. Stable on inhalers and up-to-date on vaccinations. He refused PFT.       Billing: I spent more than 30 minutes face to face and greater than 50% of time was for counseling and coordination of care about the issues above.    Frankie Hooks MD   of Medicine  Interventional Pulmonology  Department of Pulmonary, Allergy, Critical Care and Sleep Medicine   [facility], [employer]  Pager: [phone]          History:     Espinoza Willoughby is a 69 year old male with sig h/o for COPD, CML, seizure, HCV, schizophrenia, paranoid disorder, TBI who is here for evaluation/followup of lung mass .     - No new resp sx or complaints. Denies dyspnea or cough. He refused to talk to me over the phone directly and had his home nurse aid be the communicator between us.  - Here for short term follow-up of MICK nodule   - Personal hx of cancer: CML. 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 No radon.    - Tobacco hx: Past Smoker: 0.5ppd for 42-years. Quit 8wks ago  - My interpretation of the images relevant for this visit includes: nodules   - My interpretation of the PFT's relevant for this visit includes: None      Culprit Nodule(s):   1: Subsolid nodule in the anterior left upper lobe in series 6,image 80. Solid portion measures 8 mm, previously 5 mm. Ground glass portion measures 13 mm with spiculated margins.   2. Enlarged right paratracheal and subcarinal LN  3. Multiple bilateral lung nodule(s) that are sub 6 mm. First seen by chest CT on 12/2019.  There is no interval change     Other active medical problems include:   - had recurrent falls resulting in TBI. Now residing in group home for past 13yrs.    - has CML. Currently on gleevac and cont surveillance.    - has COPD. On symbicort and albuterol prn. Up-to-date on flu and PNA vaccination. Recently AECOPD 3wks ago and recovered with abx+prednisone.    - has paranoid disorder and schizophrenia   - Has seizures on AE.          Past Medical History:      Past Medical History:   Diagnosis Date     Chronic obstructive pulmonary disease, unspecified COPD type (H) 2019     CML (chronic myelocytic leukemia) (H) 2019     Frequent falls 2019     Gastroesophageal reflux disease, esophagitis presence not specified 2019     Grand mal seizure with intractable epilepsy (H) 2019     Hearing loss, unspecified hearing loss type, unspecified laterality 2019     Hepatitis C virus carrier state (H) 2019     Hyperammonemia (H) 2019     Monocular diplopia 2019     Other constipation 2019     Paranoid personality (disorder) (H) 2019     Paranoid schizophrenia (H) 2019     Polysubstance dependence (H) 2019     Seizure disorder (H) 2019     Smoker 2019     Substance-induced persisting dementia (H) 2019     Traumatic brain injury with loss of consciousness, sequela (H) 2019     Urinary incontinence, unspecified type 2019           Past Surgical History:    No past surgical history on file.       Social History:     Social History     Tobacco Use     Smoking status: Former Smoker     Packs/day: 0.00     Types: Cigarettes     Last attempt to quit: 2019     Years since quittin.6     Smokeless tobacco: Never Used   Substance Use Topics     Alcohol use: Yes          Family History:   No family history on file.        Allergies:      Allergies   Allergen Reactions     Benadryl [Diphenhydramine]      Bupropion      Seizure     Xanax  [Alprazolam]           Medications:     Current Outpatient Medications   Medication Sig     acetaminophen (TYLENOL) 500 MG tablet Take 1,000 mg by mouth 2 tabs every 6 hours as needed     albuterol (ACCUNEB) 1.25 MG/3ML neb solution NEBULIZE AND INHALE 1 VIAL EVERY 6 HOURS AS NEEDED FOR SHORTNESS OF BREATH /DYSPNEA OR WHEEZING (DX:J44.9)     albuterol (PROAIR HFA/PROVENTIL HFA/VENTOLIN HFA) 108 (90 Base) MCG/ACT inhaler Inhale 2 puffs into the lungs every 6 hours as needed for shortness of breath / dyspnea or wheezing     aspirin (ASPIRIN) 81 MG EC tablet Take 1 tablet (81 mg) by mouth daily     benzonatate (TESSALON) 100 MG capsule Take 100 mg by mouth 3 times daily as needed for cough     budesonide-formoterol (SYMBICORT) 160-4.5 MCG/ACT Inhaler Inhale 2 puffs into the lungs 2 times daily     clonazePAM (KLONOPIN) 0.5 MG tablet Take 0.5 mg by mouth 2 times daily      diclofenac (VOLTAREN) 50 MG EC tablet Take 1 tablet (50 mg) by mouth 2 times daily     famotidine (PEPCID) 20 MG tablet Take 1 tablet (20 mg) by mouth 2 times daily     ferrous sulfate (FE TABS) 325 (65 Fe) MG EC tablet Take 325 mg by mouth daily     ferrous sulfate (FEROSUL) 325 (65 Fe) MG tablet Take 1 tablet (325 mg) by mouth daily (with breakfast)     haloperidol (HALDOL) 10 MG tablet 10 mg At Bedtime      haloperidol decanoate (HALDOL DECANOATE) 100 MG/ML injection Inject 100 mg into the muscle every 30 days      IBUPROFEN 600 MG PO tablet TAKE 1 TABLET BY MOUTH THREE TIMES DAILY AS NEEDED FOR MODERATE PAIN. *1 TOTAL FILL*     melatonin 5 MG CAPS Prn at bedtime     metoprolol succinate ER (TOPROL-XL) 25 MG 24 hr tablet Take 1 tablet (25 mg) by mouth daily     metoprolol succinate ER (TOPROL-XL) 50 MG 24 hr tablet Take 1 tablet (50 mg) by mouth daily     nicotine (COMMIT) 2 MG lozenge PLACE 1 LOZENGE INSIDE THE CHEEK EVERY HOUR AS NEEDED FOR SMOKING CESSATION. **MAX OF 10 PER DAY*     nicotine (NICODERM CQ) 21 MG/24HR 24 hr patch PLACE 1 PATCH  ONTO THE SKIN EVERY 24 HOURS     omeprazole 20 MG tablet Take 20 mg by mouth 2 times daily      order for DME Equipment being ordered: Wheelchair     order for DME Equipment being ordered: Oxygen Concentrator     order for DME Equipment being ordered: Nebulizer     order for DME Equipment being ordered: Adult Briefs     order for DME Equipment being ordered: [place] belt     OXcarbazepine (TRILEPTAL) 600 MG tablet Take 600 mg by mouth 2 times daily      phenytoin (DILANTIN) 100 MG capsule Take 100 mg by mouth 2 times daily      polyethylene glycol (MIRALAX/GLYCOLAX) powder Take 1-2 tsp daily as needed     QUEtiapine (SEROQUEL) 50 MG tablet Take 50 mg by mouth 4 times daily      QUEtiapine ER (SEROQUEL XR) 300 MG 24 hr tablet Take 300 mg by mouth At Bedtime 2 caps at bedtime     senna (SENNA-TIME) 8.6 MG tablet Take 8.6-17.2 mg by mouth     trihexyphenidyl (ARTANE) 2 MG tablet      venlafaxine (EFFEXOR-XR) 75 MG 24 hr capsule Take 75 mg by mouth daily 3 caps every am     No current facility-administered medications for this visit.           Review of Systems:     CONSTITUTIONAL: negative for fever, chills, change in weight  INTEGUMENTARY/SKIN: no rash or obvious new lesions  ENT/MOUTH: no sore throat, new sinus pain or nasal drainage  RESP: see interval history  CV: negative for chest pain, palpitations or peripheral edema  GI: no nausea, vomiting, change in stools  : no dysuria  MUSCULOSKELETAL: no myalgias, arthralgias  ENDOCRINE: blood sugars with adequate control  PSYCHIATRIC: mood stable  LYMPHATIC: no new lymphadenopathy  HEME: no bleeding or easy bruisability  NEURO: no numbness, weakness, headaches         Physical Exam:      A comprehensive physical examination is deferred due to [facility] (public health emergency) telephone visit restrictions.         Current Laboratory Data:   All laboratory and imaging data reviewed.    No results found for this or any previous visit (from the past 24 hour(s)).         Previous  Pulmonary Function Testing   No results found for: 20001  No results found for: 20002  No results found for: 20003  No results found for: 20015  No results found for: 20016  No results found for: 20027  No results found for: 20028  No results found for: 20029  No results found for: 20079  No results found for: 20080  No results found for: 20081  No results found for: 20335  No results found for: 20105  No results found for: 20053  No results found for: 20054  No results found for: 20055         Previous Chest Imaging   No images are attached to the encounter.  No images are attached to the encounter or orders placed in the encounter.         Previous Cardiology Imaging   No results found for this or any previous visit (from the past 8760 hour(s)).

## 2020-05-12 NOTE — TELEPHONE ENCOUNTER
1. Blood pressure from care facility reviewed. Most reading above 140/90   Will add blood pressure med lisinopril daily.   Cont blood pressure checks.  Send results in 4 wks.     2. Continue nicotine patches.    3. I don't believe I signed for O2 concentrator. This should be from his pulmonologist.     Andres Rubio PA-C

## 2020-05-12 NOTE — TELEPHONE ENCOUNTER
Completed form faxed to [facility] #[phone]. Blood pressure readings and form sent to scanning.  JAN Gonzales

## 2020-05-12 NOTE — TELEPHONE ENCOUNTER
"Andres Rubio PA-C:  Please advise on the oxygen concentrator.  Here is Dr. Hooks's response yesterday:  \"I spoke with Lucina and confirmed Dr. Hooks has not prescribed oxygen for the patient or evaluated his oxygen needs. Dr. Rubio signed a prescription for oxygen on 2/27/20. This should be managed by Dr. Rubio's office or patient will need to be set up with general pulmonology to manage oxygen use. Dr. Hooks is managing the patient's pulmonary nodules.    Nicole Ramirez, YUMIKO   Medical Specialty Clinic Care Coordinator  [facility] \"    See 2/27/20 telephone encounter;  You had placed DME order for the oxygen concentrator.  2/28 [facility] called stating :  1. Merly states order needs to specify portable concentrator or stationary concentrator.  2. Merly states order needs to specify the liter flow rate for the oxygen concentrator. Pulmonary test results supporting the liter flow rate must be provided.   Andres Rubio PA-C said needs to be addressed by his pulmonologist.    At 3/10/20 OV you placed referral for pulmonary medicine [facility] [phone] to be followed for his COPD.  5/12/20 Virtual visit done with Dr. Hooks, pulmonology for his \"established multiple pulmonary lung nodules and his COPD.  States stable on inhalers.  He refused PFT. \"     "

## 2020-05-13 NOTE — PROGRESS NOTES
Attempted to reach patient to follow up on his conversation with Dr. Hooks regarding the lung nodules. Patient was not home when I called and person who answered the phone asked me to call back at a later time.     Nicole Ramirez RN   Medical Specialty Clinic Care Coordinator  [facility]

## 2020-05-14 NOTE — PROGRESS NOTES
I spoke with patient's home care nurse and with the patient regarding the biopsy. The patient has no desire to have the biopsy and he does understand that he may have cancer. He does not want to proceed with the biopsy at this time.     Patient's home care nurse Lucina is wondering if Dr. Hooks would take over the oxygen as the provider is refusing to manage the oxygen even though he prescribed in in February. I will discuss with Dr. Hooks to see if the patient needs to be referred to general pulm.     Nicole Ramirez RN   Medical Specialty Clinic Care Coordinator  [facility]

## 2020-05-20 NOTE — TELEPHONE ENCOUNTER
I spoke with Lucina and informed her that I did ask Dr. Hooks about the oxygen but he would like Andres Rubio patient's PCP to prescribe this. I sent a message to Andres regarding this last week but I never received a response.     We did discuss that to qualify for oxygen the patient would have to agree to formal testing which he has declined in the past and his oxygen levels will need to drop below 90% during this testing.     Lucina is going to follow up with Andres Rubio's office.     Nicole Ramirez RN   Medical Specialty Clinic Care Coordinator  [facility]

## 2020-05-20 NOTE — TELEPHONE ENCOUNTER
Health Call Center    Phone Message    May a detailed message be left on voicemail: yes     Reason for Call:  Lucina from pt's group home is requesting a call back to discuss a request from pt about getting an oxygen tank. Please call [phone].

## 2020-05-22 NOTE — TELEPHONE ENCOUNTER
Please see message/picture below and advise.   Reason for Call:  Other prescription    Detailed comments: pt needs refill of diclosenac.  Uses [facility].  Please call with question    Phone Number Patient can be reached at: Other phone number:  [phone]    Best Time: any    Can we leave a detailed message on this number? YES    Call taken on 3/16/2020 at 1:10 PM by Carolyn Wong

## 2020-06-09 NOTE — TELEPHONE ENCOUNTER
Reason for Call:  Other prescription    Detailed comments: rx: ranitidine recalled please send in a different rx.  Need it to be in a ppi list.   Phone Number Patient can be reached at: Other phone number:  [email or web address]    Best Time: any    Can we leave a detailed message on this number? YES    Call taken on 6/9/2020 at 4:48 PM by Carolyn Wnog

## 2020-06-09 NOTE — TELEPHONE ENCOUNTER
To provider:  If you send a new Prescription(s) to replace Zantac, please remove it from the medication list.  Thank you. Karen Pandey R.N.

## 2020-06-09 NOTE — TELEPHONE ENCOUNTER
Lucina from [facility] is calling and requesting an alternative for the following medication due to recall:  famotidine (PEPCID) 20 MG tablet.  Please send to [facility].  Please call with questions.  Thank you

## 2020-06-10 NOTE — TELEPHONE ENCOUNTER
Andres Rubio PA-C please advise: I called and spoke with chad Elder.  He states understanding that patient will not be receiving a replacement for the ranitidine at this time.  Continue the omeprazole 20mg, one tab twice daily with meal; cancel the one sent today with every day dosing.  Famotidine is still on his pharmacy med list.  Per our records was not available back in January.  Pharmacist states that it is still not available and has not been prescribed.  He will cancel this on patient's med list.  States appears the omeprazole probably replaced this.  Kirstin Ngo RN  I then called and spoke with YUMIKO Verdugo at [phone].   She states that he has been without the ranitidine now for a week and is complaining all the time that his stomach hurts.  She states didn't have the complaints as much when taking the ranitidine.  She states has not seen GI or had an endoscopy; he's refused to do either of these in the past.  She is wanting to know if there is an alternate med that he can use.  States just taking the omeprazole 20mg, one tab twice daily with meal isn't effective.  Kirstin Ngo RN

## 2020-06-10 NOTE — TELEPHONE ENCOUNTER
Per pharmacy:  Espinoza is already taking the omeprazole 20mg one tab bid.  Are you wanting to replace the ranitidine (which is on national recall) with something else?  Pharmacy states you had ordered the omeprazole in March.

## 2020-06-10 NOTE — TELEPHONE ENCOUNTER
Ok. I would recommend a trial of only using omeprazole for 4 wks and report back it is GERD symptoms worsen and then consider additional medication.    Andres Rubio PA-C

## 2020-06-10 NOTE — TELEPHONE ENCOUNTER
"Spoke with Emily [facility] staff.  She states that Espinoza had not had his metoprolol 75mg when bp checked at 8:30.  He took it prior to the 9:07 bp check (about 20 min)  She states that he'd also had a nicotine lozenge sometime between 8:30 and 9am as well.  Had breakfast after 9.  I had her recheck his bp now.  It is 181/66 with pulse of 66.  8:30am was 179/87 with pulse:68; 9:07am was 192/86 with pulse: 70.  He was started on lisinopril 10mg (takes this in pm) middle of May.  Takes his metoprolol in the morning.  Staff and Espinoza both deny him having any symptoms or concerns.  Espinoza states, \"my blood pressure is always high but I don't feel it\"  Staff member states bp is checked twice daily.  She states is elevated but doesn't have log with her.  She will fax the readings to Andres Rubio PA-C for advisement.  Prior logs from May are in clinic chart.  Kirstin Ngo RN    "

## 2020-06-10 NOTE — TELEPHONE ENCOUNTER
Sheila states that she has questions if you are trying to replace rinitadine with omeprazole but do not realize patient is already on omeprazole.  Please call to clarify.    Thank you.

## 2020-06-10 NOTE — TELEPHONE ENCOUNTER
Staff at group home calling to let Andres Rubio know patients blood pressure reading from this am taken at 8:30 was 179/87 pulse 68, taken again at 9:07 blood pressure  was 192/86 pulse was 70 no symptoms. Nurse from group told staff to call to let provider know.

## 2020-06-12 NOTE — TELEPHONE ENCOUNTER
Prior Authorization Retail Medication Request    Medication/Dose: cimetidine (TAGAMET) 800 MG tablet   ICD code (if different than what is on RX):    Previously Tried and Failed:    Rationale:    Insurance Name: Medicare    Insurance ID:  [id number]    And Medical Assistance #[id number]    Pharmacy Information (if different than what is on RX)  Name:    Phone:

## 2020-06-12 NOTE — TELEPHONE ENCOUNTER
We will increase his lisinopril to 20 mg a day.  We will recheck his blood pressure and lab work with a [facility] ancillary service in 2 weeks.    Andres Rubio PA-C

## 2020-06-12 NOTE — TELEPHONE ENCOUNTER
Prior Authorization Approval    Authorization Effective Date: 3/14/2020  Authorization Expiration Date: 6/12/2021  Medication: cimetidine (TAGAMET) 800 MG tablet- APPROVED   Approved Dose/Quantity:   Reference #:     Insurance Company: [employer] - Phone [phone] Fax [phone]  Expected CoPay:       CoPay Card Available:      Foundation Assistance Needed:    Which Pharmacy is filling the prescription (Not needed for infusion/clinic administered): [facility] - [place], [place] - [address]  Pharmacy Notified: Yes  Patient Notified: Comment:  **Instructed pharmacy to notify patient when script is ready to /ship.**

## 2020-06-12 NOTE — TELEPHONE ENCOUNTER
Sorry, I miss read.    A Prescription was sent to the pharmacy for Cimetidine.     Andres Rubio PA-C

## 2020-06-12 NOTE — TELEPHONE ENCOUNTER
Lucina from group home calling back to check on status of message has not heard back. Please call to discuss. Also states blood pressure readings were faxed over on Tuesday please review and advise.

## 2020-06-12 NOTE — TELEPHONE ENCOUNTER
Central Prior Authorization Team  Phone: [phone]    PA Initiation    Medication: cimetidine (TAGAMET) 800 MG tablet   Insurance Company: [employer] - Phone [phone] Fax [phone]  [facility] - [place], [place] - [address]  Filling Pharmacy Phone: [phone]  Filling Pharmacy Fax:    Start Date: 6/12/2020

## 2020-06-12 NOTE — TELEPHONE ENCOUNTER
To provider. Please advise on message regarding patient's Omeprazole as per group home is not effective      Andres Rubio PA-C please advise: I called and spoke with chad Elder.  He states understanding that patient will not be receiving a replacement for the ranitidine at this time.  Continue the omeprazole 20mg, one tab twice daily with meal; cancel the one sent today with every day dosing.  Famotidine is still on his pharmacy med list.  Per our records was not available back in January.  Pharmacist states that it is still not available and has not been prescribed.  He will cancel this on patient's med list.  States appears the omeprazole probably replaced this.  Kirstin Ngo RN  I then called and spoke with YUMIKO Verdugo at [phone].   She states that he has been without the ranitidine now for a week and is complaining all the time that his stomach hurts.  She states didn't have the complaints as much when taking the ranitidine.  She states has not seen GI or had an endoscopy; he's refused to do either of these in the past.  She is wanting to know if there is an alternate med that he can use.  States just taking the omeprazole 20mg, one tab twice daily with meal isn't effective.  YUMIKO ElaineN, RN, CPN

## 2020-06-12 NOTE — TELEPHONE ENCOUNTER
Spoke with Hailey, informed of provider note below and prescription sent to pharmacy  Appointment scheduled on ancillary drive up for blood pressure check 6/26    Zora SUEN, RN, CPN

## 2020-06-29 NOTE — TELEPHONE ENCOUNTER
Reason for Call:  Other prescription    Detailed comments: question rx: popenderol.   pt was on metaprolo previously.  Now you rx'd the popenderol there might be an interaction with the copd med  Caller informed that calls received after 3pm may not be returned same day.      Phone Number Patient can be reached at: Other phone number:  pharm*    Best Time: any    Can we leave a detailed message on this number? YES    Call taken on 6/29/2020 at 4:49 PM by Carolyn Wong

## 2020-06-29 NOTE — TELEPHONE ENCOUNTER
"I called the number provider.  Espinoza answered.  He states awareness of his elevated bp.  States he's taking his metoprolol and the lisinopril as ordered.  He states \"they don't work for me\"  States \"my bp has been elevated for 3 months\".  \"Just tell Oppel to give me the da-- propranolol that I had gotten when I was in the hospital.  That works for me.  This other stuff doesn't.\"   He refuses to come to clinic to have his bp and labwork done.  I let him know he doesn't need to come into the building; explained our \"drive in tent appts\".  He states he won't come anywhere near the clinic even if he doesn't have to come inside.  States, \"I'm not coming.  My bp is checked here with a battery operated machine that works.  Just tell him to give me the propranolol\"  I did explain that he also needs to have his labwork checked and again he states \"no\".  To Andres Rubio PA-C to advise on message below regarding bp of 203/106 yesterday ([facility] was contacted).  Kirstin Ngo RN  Patient is aware may not get response til Tuesday.  Andres Rubio PA-C out of office today.    "

## 2020-06-29 NOTE — TELEPHONE ENCOUNTER
Direct care specialist, Liliam calling from his group home.  Says they have a new protocol and she is called to report his blood pressure since it is high.  Last blood pressure reading was 203/106.  Says she already spoke with the nurse on staff and just wants to report it to his provider.      No confusion or slurred speech.  No difficutl breathing.  No chest pain.  No weakness or numbness on one side of body.  No changes in vision.    Triaged to disposition  Of See HCP Within 4 Hours.  Caller is waiting for nurse on staff to give her further direction.  Advised to call back if new symptoms develop.    Kimmy Brown RN  Triage Nurse Advisor    COVID 19 Nurse Triage Plan/Patient Instructions    Please be aware that novel coronavirus (COVID-19) may be circulating in the community. If you develop symptoms such as fever, cough, or SOB or if you have concerns about the presence of another infection including coronavirus (COVID-19), please contact your health care provider or visit [email or web address].     Disposition/Instructions    Patient to schedule a Virtual Visit with provider. Reference Visit Selection Guide.    Thank you for taking steps to prevent the spread of this virus.  o Limit your contact with others.  o Wear a simple mask to cover your cough.  o Wash your hands well and often.    Resources    [facility]: About COVID-19: [email or web address]    CDC: What to Do If You're Sick: www.cdc.gov/coronavirus/2019-ncov/about/steps-when-sick.html    CDC: Ending Home Isolation: www.cdc.gov/coronavirus/2019-ncov/hcp/disposition-in-home-patients.html     CDC: Caring for Someone: www.cdc.gov/coronavirus/2019-ncov/if-you-are-sick/care-for-someone.html     [facility]: Interim Guidance for Hospital Discharge to Home: www.health.[facility].mn.us/diseases/coronavirus/hcp/hospdischarge.pdf    [facility] clinical trials (COVID-19 research studies): [email or web address]     Below are the  COVID-19 hotlines at the [place] Department of Health ([facility]). Interpreters are available.   o For health questions: Call 156-566-4005 or 1-630.629.6349 (7 a.m. to 7 p.m.)  o For questions about schools and childcare: Call 902-533-2803 or 1-769.202.1983 (7 a.m. to 7 p.m.)       Additional Information    Negative: Difficult to awaken or acting confused (e.g., disoriented, slurred speech)    Negative: [1] Weakness of the face, arm or leg on one side of the body AND [2] new onset    Negative: [1] Numbness (i.e., loss of sensation) of the face, arm or leg on one side of the body AND [2] new onset    Negative: Severe difficulty breathing (e.g., struggling for each breath, speaks in single words)    Negative: [1] Chest pain lasts > 5 minutes AND [2] history of heart disease  (i.e., heart attack, bypass surgery, angina, angioplasty, CHF)    Negative: [1] Chest pain AND [2] took nitrogylcerin AND [3] pain was not relieved    Negative: Sounds like a life-threatening emergency to the triager    Negative: Symptom is main concern  (e.g., headache, chest pain)    Negative: Low blood pressure is main concern    Negative: [1] Systolic BP  >= 160 OR Diastolic >= 100 AND [2] cardiac or neurologic symptoms (e.g., chest pain, difficulty breathing, unsteady gait, blurred vision)    Negative: [1] Pregnant > 20 weeks (or postpartum < 6 weeks) AND [2] new hand or face swelling    Negative: [1] Pregnant > 20 weeks AND [2] BP Systolic BP  >= 140 OR Diastolic >= 90    [1] Systolic BP  >= 200 OR Diastolic >= 120  AND [2] having NO cardiac or neurologic symptoms    Protocols used: HIGH BLOOD PRESSURE-A-AH

## 2020-06-29 NOTE — TELEPHONE ENCOUNTER
"A \"client\" answered the phone.  He states that Espinoza went to the store and the help went with him.  States no other staff there to talk to.  He said to call back later.  Kirstin Ngo RN    "

## 2020-06-29 NOTE — TELEPHONE ENCOUNTER
::  Please fax copy of this conversation with Andres Rubio PA-C's orders to stop the lisinopril and metoprolol and start propranolol 40mg, one tab 2 times a day and recheck bp in 1 week to Attn: Bertha  At fax: [phone] (per verbal instructions given to me by Roberta, group home staff).  I did give these instructions to both Espinoza and Roberta.  [facility] staff does distribute patient's medications.  Espinoza refuses to come to clinic to have his bp checked.   will have done only at his house.  Kirstin Ngo RN

## 2020-06-29 NOTE — TELEPHONE ENCOUNTER
Ok for patient request. Stop: metoprolol and lisinopril.     Ok for propranolol 40mg twice a day.   Report blood pressure in 1 wk.     Andres Rubio PA-C

## 2020-06-29 NOTE — TELEPHONE ENCOUNTER
Clinic Action Needed:No  Reason for Call: [facility] is calling about the Rx they received for propranalol 40 mg, one tab two times daily.  They were questioning that dose as caller is also on Lisinopril and Metoprolol.  Read the note from YUMIKO Ngo below regarding CELESTE Gillette's order to stop metoprolol and lisinopril and start the Propranolol as ordered today.    Keeley Bradley appears to understand directives and states that it makes sense regarding the new order if the other two meds are being stopped.     Routed to: Not routed.    Idalia Wells RN  [facility]

## 2020-06-30 NOTE — TELEPHONE ENCOUNTER
::  Please fax copy of current med list and this conversation to patient's group home:  Attn: Bertha at fax: [phone].    I spoke with Brian at the pharmacy.  Made sure that his med list reflects patient to no longer be on the metoprolol or the lisinopril 10mg (as per yesterday's message).  And that the propranolol that was ordered yesterday has now been stopped due to contraindications with his COPD meds.  Patient new prescription has been sent to his pharmacy for the lisinopril-hydrochlorothiazide 10-12.5mg tab; take one tablet daily.  #90.  Brian repeated this back to me and states that their list has these updates.  Kirstin Ngo RN  I then called Espinoza and group home staff at [phone].  I spoke with both Espinoza and staff (Emily) and gave both of them the updated information regarding his blood pressure meds.  Both are aware that he can't use the propranolol due to contraindications with his COPD inhalers.  Andres Rubio PA-C sent new prescription for the lisinopril/ hydrochlorothiazide 10-12.5mg, take one tablet by mouth daily.  Patient is to have his blood pressure AND labwork rechecked again in 2 weeks.  Important to have the labwork done as well to make sure his electrolytes (sodium, potassium, etc) don't go down with taking this medication.  Understanding stated.  Espinoza states reluctance but I again let him know both bp check and labwork can be done via a drive up tent appointment and no need to come into clinic.  He states he will see. Kirstin Ngo RN

## 2020-06-30 NOTE — TELEPHONE ENCOUNTER
Andres Rubio PA-C:  Please CONFIRM:  1.  New bp med is lisinopril/ hydrochlorothiazide     10-12.5mg; take one tab daily.    2.  Patient is now not to start the propranolol due to contraindications with the inhalers.    3.  You stopped the lisinopril 20mg and the metoprolol       25mg yesterday.

## 2020-06-30 NOTE — TELEPHONE ENCOUNTER
Copy of current med list and conversation faxed to Bertha at the Group Home # [phone].  JAN Gonzales

## 2020-06-30 NOTE — TELEPHONE ENCOUNTER
Reason for Call:  Other call back    Detailed comments: group home is calling stating returning call from RN requesting change in patients BP medication,  did not find documentation on RN call. Also would like to request orders for a walker. Please call to discuss. Thank you.      Phone Number Patient can be reached at: [phone]    Best Time:     Can we leave a detailed message on this number? YES    Call taken on 6/30/2020 at 12:50 PM by Bhavna Rush

## 2020-06-30 NOTE — TELEPHONE ENCOUNTER
::  Please fax copy of current med list and this conversation to patient's group home:  Attn: Bertha at fax: [phone].

## 2020-06-30 NOTE — TELEPHONE ENCOUNTER
Andres Rubio PA-C:  Group home requesting written order for a standard walker (no wheels) for mani.  States his gait has always been a little unsteady; used to have a cane but doesn't know where it is.  Feel he'd benefit from a walker to use in the group home.  Have pended DME; please advise.     Lucina states she just got the medication orders from [facility].  We did review Andres Rubio PA-C's blood pressure medication orders;  Aware patient not to be taking the metoprolol, lisinopril 20mg, or the propranolol (which had been ordered yesterday but cancelled due to contraindications with the inhalers)  He is to start the lisinopril/ hydrochlorothiazide 10-12.5mg, take one tablet daily.  To have bp checked and labwork in 2 weeks.  May have done via our drive through tent.  Lucina said to make the bp and lab appts for Thursday July 16 around 10-11am.  He also has orders from his oncologist for labwork.  She is not sure what the tests are so will check on order and call back to find out if these labs can also be done via drive up tent.

## 2020-06-30 NOTE — TELEPHONE ENCOUNTER
Ok to stop propranol due to the use of his inhalers.   New rx sent to pharmacy for Lisinopril/hctz.   Recheck blood pressure and labs in 2 wks. With ancillary.    Andres Rubio PA-C

## 2020-07-07 NOTE — TELEPHONE ENCOUNTER
I printed the DME order for a walker from [employer] and I faxed it to the Group Home @ fax #[phone].    Routing to provider per RN's note, Andres please read note.  Thank you.    Tabby Mello,

## 2020-07-07 NOTE — TELEPHONE ENCOUNTER
Needs Prn order for Waqas Trejo.    Order was faxed for a walker and with b/p readings from last month. Was this received? It was faxed a week or so ago.    Ok to leave a detailed message.

## 2020-07-07 NOTE — TELEPHONE ENCOUNTER
::  Please assist.   1.  Andres Rubio PA-C signed the DME order 20 for the walker.  Group home needs to be faxed this order at Fax: [phone].     Please then forward to Andres Rubio PA-C to address the followin.  His list of blood pressure readings log for 2020, is scanned under his encounters as well as media tab.  Please address/advise.  2.   Requesting a prn order for the Waqas Trejo. Patient has it in his room and has been applying it to his right shoulder up to twice a day for shoulder aches.  RN pended med historically for Andres Rubio PA-C to advise/sign if ok.    Andres Rubio PA-C:  Please route back to team to notify group home.

## 2020-07-08 NOTE — TELEPHONE ENCOUNTER
Danna blood pressure reviewed. Med change June 30th. Send blood pressure reading in July. Will need blood pressure and labs done mid July. Ok for curbside testing.  Ok for [place]ay as needed.     Andres Rubio PA-C

## 2020-07-08 NOTE — TELEPHONE ENCOUNTER
Alison called.  Patient bp today at 3pm was 196/100 with pulse 96.  No chest pain; no headache, no vision concerns.  His bp med was switched to lisinopril-hydrochlorothiazide 10-12.5mg take one tab daily on 6/30.  Takes every day at 8:30am.  bp's since then as follows; first is taken 30-40 min after med and second is taken in the evening.  7/1/20: bp: 158/71 P:65   PM: 206/96 P:71  7/2/20: bp: 122/87 P:59   PM: 193/87 P:81  7/3/20: bp: 158/74 P:67   PM: 216/86 P: 85  7/4/20: bp: 159/83 P:65   PM: 182/103P96  7/5/20: bp: -----                PM: 195/95 P: 72  7/6/20: bp: -----                PM: 197/101 P:102  7/7/20: bp:138/67 P:89    PM: 233/102 P:99  7/8/20  Bp:  ----            3pm: BP: 196/100 P: 96    Please advise if needing to change or add bp med.  Pm (evening) bp's have all been elevated.

## 2020-07-08 NOTE — TELEPHONE ENCOUNTER
I spoke with Lucina.  Made aware the DME order for walker was faxed to their site.  He does have appts curbside for bp and labwork scheduled.  Did start new bp med.  Will continue to monitor and fax bp's to us in 2 weeks.  She states have gone down in the morning already; pm bp's still elevated.  Aware of ok for ana wasserman order.  Kirstin Ngo RN

## 2020-07-08 NOTE — TELEPHONE ENCOUNTER
FYI to PCP - last BP reading was 196/100, pulse of 96 as of 7/8/20 3:00 PM. Rajani Baez TC/Pt Rep

## 2020-07-09 NOTE — TELEPHONE ENCOUNTER
DME order placed by CRYSTAL Rubio for the Wheelchair has been faxed to the [facility] # [phone].  JAN Gonzales

## 2020-07-09 NOTE — TELEPHONE ENCOUNTER
I spoke with LPN at group home and gave her Andres ALONSO message as written.  Andres ALONSO has done an prescription for a wheel chair in the past and they were unable to get one due to insurance.  She thought it needed different wording.  Provider has done new dme order for wheel chair.  I will do care coordination referral for help in obtaining the wheelchair.  Referral done.  Kimberly SUEN, RN

## 2020-07-09 NOTE — TELEPHONE ENCOUNTER
"Nuha from group home calling to add another DAREN the the list that was given earlier today(see previous encounter). \"At 8 pm tonight his BP was 200/99 P 100, No sx.\" Advised if he experiences any sx to call . I will add this addendum to the encounter already in [facility] from today 7/8.  Erin Marshall RN [facility]      "

## 2020-07-09 NOTE — PROGRESS NOTES
Clinic Care Coordination Contact  Care Team Conversations    Reason for Referral: help obtaining wheelchair     Additional pertinent details:  Pt has gait problems and hx of falls.  Provider would like him to have a wheel chair.  dme order done in feb and group home was unable to obtain one.  Another dme done today.  Can you help with this?  If physical therapy evaluation needed let provider know.     Psychiatric received referral to assist patient in obtaining a wheelchair. Chart reviewed. RN note indicates LPN at  tried to help obtain a wheelchair in past, however, unable to get on due to insurance. New DME order has been placed as of today, per documenation. Routed instructions to provider and RN indicating that OT needs to complete wheelchair evaluation and referral to home care needs to be placed. No further Care Coordination involvement on obtaining wheelchair.     AMINA Phillips  Primary Care Clinic- Social Work Care Coordinator  [facility]  Ph: [phone]  7/9/2020 2:34 PM

## 2020-07-09 NOTE — TELEPHONE ENCOUNTER
To Provider:  Blood Pressure readings for the patient were faxed in for your review.They have also requested a new order for a a wheelchair for mobility. Left in your basket for review and advisement. Please route back to the team when done.  Thank you,  Radha Lyman TC

## 2020-07-11 NOTE — TELEPHONE ENCOUNTER
Pt's group home staff member Cassandra reports pt had low blood pressure this morning and lisinopril/hctz was held. Blood pressure at 2:30 pm today 105/58 pulse 88. Blood pressure tonight at 8 166/78 pulse 84 and lisinopril/hctz held tonight as well. Per Cassandra pt was never symptomatic. Per Cassandra pt's blood pressure is normally fairly high. Per Cassandra she was told to call with updates.     Advised Cassandra message will be sent to PCP. Call in the morning with updated blood pressure or any symptoms.     Cassandra verbalizes understanding and agrees to plan.     Reason for Disposition    Caller has NON-URGENT medication question about med that PCP prescribed and triager unable to answer question    Protocols used: MEDICATION QUESTION CALL-A-

## 2020-07-12 NOTE — TELEPHONE ENCOUNTER
"    Additional Information    Systolic BP  >= 180 OR Diastolic >= 110    Negative: Difficult to awaken or acting confused (e.g., disoriented, slurred speech)    Negative: Severe difficulty breathing (e.g., struggling for each breath, speaks in single words)    Negative: [1] Weakness of the face, arm or leg on one side of the body AND [2] new onset    Negative: [1] Numbness (i.e., loss of sensation) of the face, arm or leg on one side of the body AND [2] new onset    Negative: [1] Chest pain lasts > 5 minutes AND [2] history of heart disease  (i.e., heart attack, bypass surgery, angina, angioplasty, CHF)    Negative: [1] Chest pain AND [2] took nitrogylcerin AND [3] pain was not relieved    Negative: Sounds like a life-threatening emergency to the triager    Negative: Symptom is main concern  (e.g., headache, chest pain)    Negative: Low blood pressure is main concern    Negative: [1] Systolic BP  >= 160 OR Diastolic >= 100 AND [2] cardiac or neurologic symptoms (e.g., chest pain, difficulty breathing, unsteady gait, blurred vision)    Negative: [1] Pregnant > 20 weeks (or postpartum < 6 weeks) AND [2] new hand or face swelling    Negative: [1] Pregnant > 20 weeks AND [2] BP Systolic BP  >= 140 OR Diastolic >= 90    Negative: [1] Systolic BP  >= 200 OR Diastolic >= 120  AND [2] having NO cardiac or neurologic symptoms    Negative: [1] Postpartum < 6 weeks AND [2] BP Systolic BP  >= 140 OR Diastolic >= 90    Negative: [1] Systolic BP  >= 180 OR Diastolic >= 110 AND [2] missed most recent dose of blood pressure medication    Answer Assessment - Initial Assessment Questions  1. BLOOD PRESSURE: \"What is the blood pressure?\" \"Did you take at least two measurements 5 minutes apart?\"      The patient is having adjustments made in his hypertension medication.  Yesterday his B/P was so low they held his two doses of lisinopril. This morning his B/P was 192/83 with pulse of 89 and then he had his morning dose of lisinopril one " "hour later his B/P was 202/84 with pulse of 94.  Now this evening at 8 PM his BP is 179/152 with pulse of 93.  He is not having any chest pain, SOB, blurry vision or headaches.  2. ONSET: \"When did you take your blood pressure?\"      He will take his second dose of lisinopril now.  3. HOW: \"How did you obtain the blood pressure?\" (e.g., visiting nurse, automatic home BP monitor)      He lives in a group home and the nursing staff checks his B/P  4. HISTORY: \"Do you have a history of high blood pressure?\"      yes  5. MEDICATIONS: \"Are you taking any medications for blood pressure?\" \"Have you missed any doses recently?\"      Yes he takes lisinopril and yesterday his two daily doses were held  6. OTHER SYMPTOMS: \"Do you have any symptoms?\" (e.g., headache, chest pain, blurred vision, difficulty breathing, weakness)      no    Protocols used: HIGH BLOOD PRESSURE-A-AH      "

## 2020-07-13 NOTE — TELEPHONE ENCOUNTER
Lucina, nurse with patient group home facility is wanting parameters for when to hold Espinoza's lisinopril-hydrochlorothiazide 10-12.5mg one tablet bid.  She states Espinoza is not wanting to take it when he feels it's too low.  She is wondering if may get order to hold the medication dose if bp less than 100/60?  He does have appointment for bp check in clinic this week with MA.  Please advise.

## 2020-07-13 NOTE — TELEPHONE ENCOUNTER
Would like to know parameters for lisinopril-hydrochlorothiazide (ZESTORETIC) 10-12.5 MG tablet. Please call to advise. OK to KATIE Baez TC/Pt Rep

## 2020-07-13 NOTE — TELEPHONE ENCOUNTER
"Patient and staff are requesting parameters as to when to hold his lisinopril-hydrochlorothiazide.  Espinoza is insistent that he shouldn't take it when his bp is \"low\"  They are considering low as below 100/60.  Will have Andres Rubio PA-C advise on Tuesday.    Patient did take his lisinopril-hydrochlorothiazide this morning.  bp was 144/74 and pulse 80 this morning..    Per prior notes facility had held his bp med on 7/10 because they felt his bp was too low.  Then on 7/11 his am bp was 192/83 with evening bp 179/152  Did take both doses of lisinopril-hydrochlorothiazide but bp's taken before both a.m. and then pm dose.  7/12 8am bp 112/60 then took lisinopril-hydrochlorothiazide.  9:14am pm: 99/57.  8pm bp: 166/77 - then took his med.  9pm bp: 168/73.    Please advise.    "

## 2020-07-14 NOTE — TELEPHONE ENCOUNTER
I recommend taking blood pressure med twice a day. Ok to check blood pressure twice a day.     Andres Rubio PA-C

## 2020-07-14 NOTE — TELEPHONE ENCOUNTER
I spoke with Lucina at group home.  I did inform her Espinoza is to take his lisinopril - hydrochlorothiazide twice a day as ordered.  Do not hold/stop it. I did read Andres Rubio PA-C's note/instructions to her.  States understanding.  Will continue monitoring his bp; Andres Rubio PA-C will determine in future if adjustments need to be made to his medications.  Kirstin Ngo RN

## 2020-07-14 NOTE — TELEPHONE ENCOUNTER
Do not hold lisinopril. Please take it twice a day. We con't blood pressure reading and on average I will determine in the future if adjustments need to be made to his medications.     Thanks  Andres Rubio PA-C

## 2020-07-14 NOTE — TELEPHONE ENCOUNTER
Andres Rubio PA-C:  Here is his bp readings last night.  Group home is wanting to know if should hold his lisinipril-hydrochlorothiazide if bp below 100/60.  Patient not wanting to take it if feels bp is too low.  Is taking his bp med bid.  Please advise.    bp last night:    Reason for call:  Other   Patient called regarding (reason for call): blood pressure reading 8 pm before lisinopril 179/ 93 pulse 92; after lisinopril 139/73 pulse 90

## 2020-07-14 NOTE — TELEPHONE ENCOUNTER
Reason for call:  Other   Patient called regarding (reason for call): blood pressure reading 8 pm before lisinopril 179/ 93 pulse 92; after lisinopril 139/73 pulse 90    Additional comments: n/a    Phone number to reach patient:  Cell number on file:    Telephone Information:   Mobile [phone]       Best Time:  Any    Can we leave a detailed message on this number?  YES    Travel screening: Not Applicable

## 2020-07-20 NOTE — TELEPHONE ENCOUNTER
"Mitul, staff caregiver at [facility] home, calling to report a high blood pressure.    /85 pulse 92 before his Lisinopril medication tonight.  A recheck 30 minutes after medication given is 200/90, pulse 89. Denies symptoms.  Last night 7-18 was 152/76.  On 7-17 was 208/103.    Staff says his blood pressure and been very \"up and down\" more lately.    Will call back in 30 minutes to recheck BP a full hour after medication given.  Call back number is [phone].    BP recheck at 9:35pm is 104/62.    Should call PCP to evaluate and discuss.  Will call back for worsening signs or symptoms.    Elicia Schmidt RN  [facility]      Additional Information    Negative: Difficult to awaken or acting confused (e.g., disoriented, slurred speech)    Negative: Severe difficulty breathing (e.g., struggling for each breath, speaks in single words)    Negative: [1] Weakness of the face, arm or leg on one side of the body AND [2] new onset    Negative: [1] Numbness (i.e., loss of sensation) of the face, arm or leg on one side of the body AND [2] new onset    Negative: [1] Chest pain lasts > 5 minutes AND [2] history of heart disease  (i.e., heart attack, bypass surgery, angina, angioplasty, CHF)    Negative: [1] Chest pain AND [2] took nitrogylcerin AND [3] pain was not relieved    Negative: Sounds like a life-threatening emergency to the triager    Negative: [1] Systolic BP  >= 160 OR Diastolic >= 100 AND [2] cardiac or neurologic symptoms (e.g., chest pain, difficulty breathing, unsteady gait, blurred vision)    Negative: [1] Pregnant > 20 weeks (or postpartum < 6 weeks) AND [2] new hand or face swelling    Negative: [1] Pregnant > 20 weeks AND [2] BP Systolic BP  >= 140 OR Diastolic >= 90    Negative: [1] Systolic BP  >= 200 OR Diastolic >= 120  AND [2] having NO cardiac or neurologic symptoms    Negative: [1] Postpartum < 6 weeks AND [2] BP Systolic BP  >= 140 OR Diastolic >= 90    " Negative: [1] Systolic BP  >= 180 OR Diastolic >= 110 AND [2] missed most recent dose of blood pressure medication    Protocols used: HIGH BLOOD PRESSURE-A-AH

## 2020-07-24 NOTE — TELEPHONE ENCOUNTER
We received a fax from Home [facility] nurse Lucina HARE with the patients blood pressure readings for the month of July.  You can contact Lucina @[phone].      To provider, I placed the list of BP readings in your basket for review.  Tabby Mello,

## 2020-07-24 NOTE — TELEPHONE ENCOUNTER
Reason for call:  Other   Patient called regarding (reason for call): Blood pressure check     07/23/20 @ 5:12pm 188/79 with pulse of 92 after taking  BP med     Then at @6:40pm  200/95 with pulse of 90    Additional comments: staff called to update    Phone number to reach patient:  Cell number on file:    Telephone Information:   Mobile [phone]       Best Time:  any    Can we leave a detailed message on this number?  YES    Travel screening: Not Applicable

## 2020-07-24 NOTE — TELEPHONE ENCOUNTER
See 7/21 nurse triage encounter  Spoke with Serjio at [facility], informed Andres Rubio PA-C is aware of patient's elevated blood pressures, patient needs to be seen for blood pressure medication management    Zora SUEN, RN, CPN

## 2020-07-26 NOTE — TELEPHONE ENCOUNTER
Colt calling from patient's group home to report today's blood pressure readings:    8:44 am 161/77 pulse 73 before meds  11:30 am 181/90 pulse of 80  8:00 pm 192/94 pulse of 82    Says patient denies any symptoms.  Says PCP is aware of high blood pressures and they are working on getting patient into clinic for blood pressure management but patient is refusing.  Results forwarded to PCP.    Kimmy Brown RN  T[facility] Nurse Advisor    COVID 19 Nurse Triage Plan/Patient Instructions    Please be aware that novel coronavirus (COVID-19) may be circulating in the community. If you develop symptoms such as fever, cough, or SOB or if you have concerns about the presence of another infection including coronavirus (COVID-19), please contact your health care provider or visit [email or web address].     Disposition/Instructions    Virtual Visit with provider recommended. Reference Visit Selection Guide.  In-Person Visit with provider recommended. Reference Visit Selection Guide.    Thank you for taking steps to prevent the spread of this virus.  o Limit your contact with others.  o Wear a simple mask to cover your cough.  o Wash your hands well and often.    Resources    [facility]: About COVID-19: [email or web address]    CDC: What to Do If You're Sick: www.cdc.gov/coronavirus/2019-ncov/about/steps-when-sick.html    CDC: Ending Home Isolation: www.cdc.gov/coronavirus/2019-ncov/hcp/disposition-in-home-patients.html     CDC: Caring for Someone: www.cdc.gov/coronavirus/2019-ncov/if-you-are-sick/care-for-someone.html     [facility]: Interim Guidance for Hospital Discharge to Home: www.health.state.[place]/diseases/coronavirus/hcp/hospdischarge.pdf    [facility] clinical trials (COVID-19 research studies): clinicalaffairs.[facility]/umn-clinical-trials     Below are the COVID-19 hotlines at the [place] [facility] ([facility]). Interpreters are available.   o For health questions: Call 631-686-0062 or  1-386.424.8653 (7 a.m. to 7 p.m.)  o For questions about schools and childcare: Call 904-531-8155 or 1-612.345.9207 (7 a.m. to 7 p.m.)       Additional Information    Negative: [1] Systolic BP  >= 160 OR Diastolic >= 100 AND [2] cardiac or neurologic symptoms (e.g., chest pain, difficulty breathing, unsteady gait, blurred vision)    Negative: Difficult to awaken or acting confused (e.g., disoriented, slurred speech)    Negative: Severe difficulty breathing (e.g., struggling for each breath, speaks in single words)    Negative: [1] Weakness of the face, arm or leg on one side of the body AND [2] new onset    Negative: [1] Numbness (i.e., loss of sensation) of the face, arm or leg on one side of the body AND [2] new onset    Negative: [1] Chest pain lasts > 5 minutes AND [2] history of heart disease  (i.e., heart attack, bypass surgery, angina, angioplasty, CHF)    Negative: [1] Chest pain AND [2] took nitrogylcerin AND [3] pain was not relieved    Negative: Sounds like a life-threatening emergency to the triager    Negative: Symptom is main concern  (e.g., headache, chest pain)    Negative: Low blood pressure is main concern    Negative: [1] Pregnant > 20 weeks (or postpartum < 6 weeks) AND [2] new hand or face swelling    Negative: [1] Pregnant > 20 weeks AND [2] BP Systolic BP  >= 140 OR Diastolic >= 90    Negative: [1] Systolic BP  >= 200 OR Diastolic >= 120  AND [2] having NO cardiac or neurologic symptoms    Negative: [1] Postpartum < 6 weeks AND [2] BP Systolic BP  >= 140 OR Diastolic >= 90    Negative: [1] Systolic BP  >= 180 OR Diastolic >= 110 AND [2] missed most recent dose of blood pressure medication    Systolic BP  >= 180 OR Diastolic >= 110    Protocols used: HIGH BLOOD PRESSURE-A-AH

## 2020-07-28 NOTE — TELEPHONE ENCOUNTER
Mitul is calling from the patient's group home to report today's blood pressure reading:    Blood Pressure: 8:00pm  158/58  Pulse 94.    Patient denies any symptoms at this time.  Results forwarded to his PCP.    Yamilet Medina RN on 7/27/2020 at 8:42 PM    COVID 19 Nurse Triage Plan/Patient Instructions    Please be aware that novel coronavirus (COVID-19) may be circulating in the community. If you develop symptoms such as fever, cough, or SOB or if you have concerns about the presence of another infection including coronavirus (COVID-19), please contact your health care provider or visit [email or web address].     Disposition/Instructions    Home care recommended. Follow home care protocol based instructions.    Thank you for taking steps to prevent the spread of this virus.  o Limit your contact with others.  o Wear a simple mask to cover your cough.  o Wash your hands well and often.    Resources    [facility]: About COVID-19: [email or web address]    CDC: What to Do If You're Sick: www.cdc.gov/coronavirus/2019-ncov/about/steps-when-sick.html    CDC: Ending Home Isolation: www.cdc.gov/coronavirus/2019-ncov/hcp/disposition-in-home-patients.html     CDC: Caring for Someone: www.cdc.gov/coronavirus/2019-ncov/if-you-are-sick/care-for-someone.html     [facility]: Interim Guidance for Hospital Discharge to Home: www.health.[facility].mn.[place]/diseases/coronavirus/hcp/hospdischarge.pdf    [facility] clinical trials (COVID-19 research studies): clinicalaffairs.[facility]/n-clinical-trials     Below are the COVID-19 hotlines at the [facility] of [facility] ([facility]). Interpreters are available.   o For health questions: Call 615-581-4769 or 1-710.500.6260 (7 a.m. to 7 p.m.)  o For questions about schools and childcare: Call 322-221-9451 or 1-612.371.6410 (7 a.m. to 7 p.m.)     Reason for Disposition    [1] Systolic BP  >= 130 OR Diastolic >= 80 AND [2] taking BP medications    Additional Information     Negative: Difficult to awaken or acting confused (e.g., disoriented, slurred speech)    Negative: Severe difficulty breathing (e.g., struggling for each breath, speaks in single words)    Negative: [1] Weakness of the face, arm or leg on one side of the body AND [2] new onset    Negative: [1] Numbness (i.e., loss of sensation) of the face, arm or leg on one side of the body AND [2] new onset    Negative: [1] Chest pain lasts > 5 minutes AND [2] history of heart disease  (i.e., heart attack, bypass surgery, angina, angioplasty, CHF)    Negative: [1] Chest pain AND [2] took nitrogylcerin AND [3] pain was not relieved    Negative: Sounds like a life-threatening emergency to the triager    Negative: Symptom is main concern  (e.g., headache, chest pain)    Negative: Low blood pressure is main concern    Negative: [1] Systolic BP  >= 160 OR Diastolic >= 100 AND [2] cardiac or neurologic symptoms (e.g., chest pain, difficulty breathing, unsteady gait, blurred vision)    Negative: [1] Pregnant > 20 weeks (or postpartum < 6 weeks) AND [2] new hand or face swelling    Negative: [1] Pregnant > 20 weeks AND [2] BP Systolic BP  >= 140 OR Diastolic >= 90    Negative: [1] Systolic BP  >= 200 OR Diastolic >= 120  AND [2] having NO cardiac or neurologic symptoms    Negative: [1] Postpartum < 6 weeks AND [2] BP Systolic BP  >= 140 OR Diastolic >= 90    Negative: [1] Systolic BP  >= 180 OR Diastolic >= 110 AND [2] missed most recent dose of blood pressure medication    Negative: Systolic BP  >= 180 OR Diastolic >= 110    Negative: Ran out of BP medications    Negative: Systolic BP  >= 160 OR Diastolic >= 100    Negative: [1] Taking BP medications AND [2] feels is having side effects (e.g., impotence, cough, dizzy upon standing)    Negative: [1] Systolic BP  >= 130 OR Diastolic >= 80 AND [2] pregnant    Protocols used: HIGH BLOOD PRESSURE-A-    Yamilet Medina RN on 7/27/2020 at 8:46 PM

## 2020-07-29 NOTE — TELEPHONE ENCOUNTER
S: B/P readings.  B: Mitul in home caregiver calling with today's B/P readings per patient's protocol.  Has not missed any medication today.  Patient denies any symptoms related to hypertension    0900   155/76   HR 89  0900 took medication  1714 172/80    HR 89  1714  Took medication  1933  192/83    HR 89    A: Patient does not want to make an appointment to come into the clinic to see his PCP because of Covid-19.    R: Writer will send message to PCP.    P [phone].    Aminata Nelson RN, [facility]        Additional Information    Negative: Sounds like a life-threatening emergency to the triager    Negative: Pregnant > 20 weeks or postpartum (< 6 weeks after delivery) and new hand or face swelling    Negative: Pregnant > 20 weeks and BP > 140/90    Negative: Systolic BP >= 160 OR Diastolic >= 100, and any cardiac or neurologic symptoms (e.g., chest pain, difficulty breathing, unsteady gait, blurred vision)    Negative: Patient sounds very sick or weak to the triager    Negative: BP Systolic BP >= 140 OR Diastolic >= 90 and postpartum (from 0 to 6 weeks after delivery)    Negative: Systolic BP >= 180 OR Diastolic >= 110, and missed most recent dose of blood pressure medication    Negative: Systolic BP >= 180 OR Diastolic >= 110    Negative: Patient wants to be seen    Negative: Ran out of BP medications    Negative: Taking BP medications and feels is having side effects (e.g., impotence, cough, dizziness)    Negative: Systolic BP >= 160 OR Diastolic >= 100    Negative: Systolic BP >= 130 OR Diastolic >= 80, and pregnant    Systolic BP >= 130 OR Diastolic >= 80, and is taking BP medications    Protocols used: HIGH BLOOD PRESSURE-A-OH

## 2020-07-29 NOTE — TELEPHONE ENCOUNTER
Spoke with Nuha,   Patient's blood pressure at 5:30 = 188/89, P 94  Patient then took his lisinopril-hydrochlorothiazide and an hour later blood pressure - 194/84, P 88    Currently no symptoms       To provider to further advise    Zora SUEN, RN, CPN

## 2020-07-29 NOTE — TELEPHONE ENCOUNTER
Nuha states the patients blood pressure is 188 over 89 with 94 pulse at 5:30 and she took his bp at 6:30 was 194 over 84 and 88 pulse. She was advised to talk to a triage nurse and declined

## 2020-07-30 NOTE — TELEPHONE ENCOUNTER
I cannot safely make anymore blood pressure recommendations without see's Espinoza Willoughby in the clinic.  I do not find I necessary to check his blood pressure daily if he is not able to be seen or refusing care.    I recommend once a week blood pressure reading from now on.    Andres Rubio PA-C

## 2020-07-30 NOTE — TELEPHONE ENCOUNTER
Spoke with Hailey at group home, informed of provider note as below. She verbalized understanding    To provider to clarify, would you like them to still call on elevated blood pressures?      Zora SUEN, RN, CPN

## 2020-08-06 NOTE — TELEPHONE ENCOUNTER
Reason for Call:  Other call back    Detailed comments: pt would like to discuss health with you  Caller informed that calls received after 3pm may not be returned same day.      Phone Number Patient can be reached at: Cell number on file:    Telephone Information:   Mobile [phone]       Best Time: any    Can we leave a detailed message on this number? Not Applicable    Call taken on 8/6/2020 at 6:16 PM by Carolyn Wong

## 2020-08-07 NOTE — TELEPHONE ENCOUNTER
See phone message 7/23/20 and 7/29/20.    Pt states he will not come to clinic and he wants Andres ALONSO to continue to change hypertension medications without having to come to clinic.    Advised pt to safely manage his blood pressure Andres Ramiro ALONSO does need to see him in clinic, he can't make more changes over the phone.  Pt states I do not want to talk to you any more and hung up.    To provider as fyi.  Kimberly Herrmann BSN, RN

## 2020-08-07 NOTE — TELEPHONE ENCOUNTER
Pt notified of provider message as written.  He said can I leave immediately after. I advised after blood is drawn and bp taken yes.  I can't guarantee there will be no wait but you would wait in car and then come to tent when ready.  He said if it does not work well he will raise hell.  I advised we would prefer he not come if he is going to raise hell, we will do our best to get him in and out.  He will think about it and call and schedule if needed.  I requested to talk to staff at [facility] to give option information to them and he declined.  Kimberly Herrmann BSN, RN

## 2020-08-27 NOTE — TELEPHONE ENCOUNTER
Routing refill request to provider for review/approval because:    Patient failed:  Blood pressure under 140/90 in past 12 months  BP Readings from Last 3 Encounters:   03/10/20 (!) 144/74   02/18/20 (!) 154/70   02/17/20 (!) 163/77     No appointment pending at this time.  Routing to provider to advise.    Larissa SUEN, RN

## 2020-09-14 NOTE — TELEPHONE ENCOUNTER
1) can he get a lower dose of the nicotine patch. Currently at 21 mg.     2) an in-person visit is needed, but mani is not up for doing that. Can a video visit be done?     Please call and let know.

## 2020-09-16 NOTE — TELEPHONE ENCOUNTER
Oxcarbazepine 600mg; take one tab bid #60 refill request from [facility] pharmacy.  Adriana (pharmacist) states patient moved into the group home about a year ago.  Prior provider from [place] had been refilling (had had a year of refills)  States that person has now refused to refill.  She states prescription sent to pcp because unsure who else to send to.  Andres Rubio PA-C has never filled this med.   Patient established with Andres Rubio PA-C on 9/11/19.  Referrals were given for oncology, neurology and psych.  He was going to establish with [facility] For psych.  I see no indication he's established with anyone; multiple no shows and cancellations within our system for oncology, neuro, labs.   Last OV Andres Rubio PA-C was 3/10/20  To Andres Rubio PA-C to advise if is willing to fill patient's oxcarbazepine.  Pharmacy not sure if for psych or seizures.  States will also need to have phenytoin refilled soon as well.  Please advise.  She states will contact group [place] and see if is being followed by neuro/psych anywhere.  Kirstin Ngo RN

## 2020-09-17 NOTE — TELEPHONE ENCOUNTER
Per pharmacy will contact group home for further information on medication. To disregard request at this time      Zora SUEN, RN, CPN

## 2020-09-17 NOTE — TELEPHONE ENCOUNTER
What is he taking these medications for?   I will refer him to the specialists to refills. I can refill in the mean time.    Andres Rubio PA-C

## 2020-10-12 NOTE — TELEPHONE ENCOUNTER
Routing refill request to provider for review/approval because:  BP Readings from Last 3 Encounters:   03/10/20 (!) 144/74   02/18/20 (!) 154/70   02/17/20 (!) 163/77     Kimberly SUEN, RN

## 2020-11-04 NOTE — PROGRESS NOTES
Subjective     Espinoza Willoughby is a 70 year old male who presents to clinic today for the following health issues:    HPI         {SUPERLIST (Optional):096890}  {additonal problems for provider to add (Optional):389519}    Review of Systems   {ROS COMP (Optional):758422}      Objective    There were no vitals taken for this visit.  There is no height or weight on file to calculate BMI.  Physical Exam   {Exam List (Optional):136693}    {Diagnostic Test Results (Optional):038862}        {PROVIDER CHARTING PREFERENCE:416073}

## 2020-11-05 NOTE — Clinical Note
Please set patient up for curbside fasting labs if possible somewhere.  Please fax AVS to [phone].    Andres Rubio PA-C

## 2020-11-05 NOTE — PATIENT INSTRUCTIONS
Referral was placed for neurology.    Okay for daily blood pressure checks.    Recommend future fasting labs.  Please make an appointment to do that.    Follow-up will depend on lab results.    Okay to monitor leg swelling.  If it worsens reach back out to me.    Recheck yearly.

## 2020-11-05 NOTE — PROGRESS NOTES
"Espinoza Willoughby is a 70 year old male who is being evaluated via a billable telephone visit.      The patient has been notified of following:     \"This telephone visit will be conducted via a call between you and your physician/provider. We have found that certain health care needs can be provided without the need for a physical exam.  This service lets us provide the care you need with a short phone conversation.  If a prescription is necessary we can send it directly to your pharmacy.  If lab work is needed we can place an order for that and you can then stop by our lab to have the test done at a later time.    Telephone visits are billed at different rates depending on your insurance coverage. During this emergency period, for some insurers they may be billed the same as an in-person visit.  Please reach out to your insurance provider with any questions.    If during the course of the call the physician/provider feels a telephone visit is not appropriate, you will not be charged for this service.\"    Patient has given verbal consent for Telephone visit?  Yes    What phone number would you like to be contacted at? [phone]    How would you like to obtain your AVS? Mail a copy    Subjective     Espinoza Willoughby is a 70 year old male who presents via phone visit today for the following health issues:  Spoke to caregiver on the phone regarding Espinoza he was in the background.  They have been checking his blood pressures and they have been running 120's/ 70's. In am.    History of seizures, needs referral to neurology.  He has not seen a neurologist since he has been in there care for the last year or 2 ago.     Also was having occ bilateral leg swelling. Better in am and worse in pm.  No chest pain or shortness of breath or any calf pain.  He states it really does not bother him.      Review of Systems   Constitutional, HEENT, cardiovascular, pulmonary, gi and gu systems are negative, except as otherwise " noted.       Objective          Vitals:  No vitals were obtained today due to virtual visit.    healthy, alert and no distress  PSYCH: Alert and oriented times 3; coherent speech, normal   rate and volume, able to articulate logical thoughts, able   to abstract reason, no tangential thoughts, no hallucinations   or delusions  His affect is normal  RESP: No cough, no audible wheezing, able to talk in full sentences  Remainder of exam unable to be completed due to telephone visits        Assessment & Plan       ICD-10-CM    1. Seizure disorder (H)  G40.909 NEUROLOGY ADULT REFERRAL   2. Hypertension goal BP (blood pressure) < 140/90  I10 Lipid panel reflex to direct LDL Fasting     lisinopril-hydrochlorothiazide (ZESTORETIC) 10-12.5 MG tablet   recommend fasting labs.     Talk to them about his concerns at this point referral was placed for neurology.  It was recommended to get fasting lab work updated.  Medication was refilled.    Warning signs were discussed about legs swelling.    Return in about 1 year (around 11/5/2021) for recheck.    Andres Rubio PA-C  [facility]    Phone call duration:  9 minutes    AVS Fax: to [phone]

## 2020-11-12 NOTE — TELEPHONE ENCOUNTER
"Requested Prescriptions   Pending Prescriptions Disp Refills     budesonide-formoterol (SYMBICORT) 160-4.5 MCG/ACT Inhaler       Sig: Inhale 2 puffs into the lungs 2 times daily       Long-Acting Beta Agonist Inhalers Protocol  Failed - 11/12/2020  8:25 AM        Failed - Order for Serevent, Striverdi, or Foradil and pt has steroid inhaler        Passed - Patient is age 12 or older        Passed - Recent (12 mo) or future (30 days) visit within the authorizing provider's specialty     Patient has had an office visit with the authorizing provider or a provider within the authorizing providers department within the previous 12 mos or has a future within next 30 days. See \"Patient Info\" tab in inbasket, or \"Choose Columns\" in Meds & Orders section of the refill encounter.              Passed - Medication is active on med list       Inhaled Steroids Protocol Passed - 11/12/2020  8:25 AM        Passed - Patient is age 12 or older        Passed - Recent (12 mo) or future (30 days) visit within the authorizing provider's specialty     Patient has had an office visit with the authorizing provider or a provider within the authorizing providers department within the previous 12 mos or has a future within next 30 days. See \"Patient Info\" tab in inbasket, or \"Choose Columns\" in Meds & Orders section of the refill encounter.              Passed - Medication is active on med list             "

## 2020-12-01 NOTE — TELEPHONE ENCOUNTER
Routing refill request to provider for review/approval because:  BP Readings from Last 3 Encounters:   03/10/20 (!) 144/74   02/18/20 (!) 154/70   02/17/20 (!) 163/77           Zora SUEN, RN, CPN

## 2020-12-01 NOTE — TELEPHONE ENCOUNTER
Pharmacy is calling stating that they need an Rx for the 7mg of nicotine patch.  Please call with questions.  Thank you

## 2020-12-22 NOTE — TELEPHONE ENCOUNTER
"Routing refill request to provider for review/approval because:  Failed protocol.  No future appointment scheduled. Please advise. Thank you. Karen Pandey R.N.     Requested Prescriptions   Pending Prescriptions Disp Refills    lisinopril-hydrochlorothiazide (ZESTORETIC) 10-12.5 MG tablet [Pharmacy Med Name: Lisinopril-hydroCHLOROthiazide 10-12.5 MG Tablet] 62 tablet 11     Sig: TAKE 1 TABLET BY MOUTH TWICE DAILY       Diuretics (Including Combos) Protocol Failed - 12/22/2020  2:07 PM        Failed - Blood pressure under 140/90 in past 12 months     BP Readings from Last 3 Encounters:   03/10/20 (!) 144/74   02/18/20 (!) 154/70   02/17/20 (!) 163/77                 Passed - Recent (12 mo) or future (30 days) visit within the authorizing provider's specialty     Patient has had an office visit with the authorizing provider or a provider within the authorizing providers department within the previous 12 mos or has a future within next 30 days. See \"Patient Info\" tab in inbasket, or \"Choose Columns\" in Meds & Orders section of the refill encounter.              Passed - Medication is active on med list        Passed - Patient is age 18 or older        Passed - Normal serum creatinine on file in past 12 months     Recent Labs   Lab Test 01/29/20  1259   CR 0.84              Passed - Normal serum potassium on file in past 12 months     Recent Labs   Lab Test 02/12/20   POTASSIUM 3.8                    Passed - Normal serum sodium on file in past 12 months     Recent Labs   Lab Test 01/29/20  1259                ACE Inhibitors (Including Combos) Protocol Failed - 12/22/2020  2:07 PM        Failed - Blood pressure under 140/90 in past 12 months     BP Readings from Last 3 Encounters:   03/10/20 (!) 144/74   02/18/20 (!) 154/70   02/17/20 (!) 163/77                 Passed - Recent (12 mo) or future (30 days) visit within the authorizing provider's specialty     Patient has had an office visit with the authorizing " "provider or a provider within the authorizing providers department within the previous 12 mos or has a future within next 30 days. See \"Patient Info\" tab in inbasket, or \"Choose Columns\" in Meds & Orders section of the refill encounter.              Passed - Medication is active on med list        Passed - Patient is age 18 or older        Passed - Normal serum creatinine on file in past 12 months     Recent Labs   Lab Test 01/29/20  1259   CR 0.84       Ok to refill medication if creatinine is low          Passed - Normal serum potassium on file in past 12 months     Recent Labs   Lab Test 02/12/20   POTASSIUM 3.8                        "

## 2020-12-28 NOTE — TELEPHONE ENCOUNTER
Patient had recent virtual visit.  Asthma not addressed. Please advise on refill.  Fiona Akins RN

## 2021-01-04 DIAGNOSIS — J12.82 PNEUMONIA DUE TO 2019 NOVEL CORONAVIRUS: Primary | ICD-10-CM

## 2021-01-04 DIAGNOSIS — U07.1 PNEUMONIA DUE TO 2019 NOVEL CORONAVIRUS: Primary | ICD-10-CM

## 2022-02-17 PROBLEM — K21.9 GASTROESOPHAGEAL REFLUX DISEASE: Status: ACTIVE | Noted: 2019-09-11

## 2023-09-01 NOTE — TELEPHONE ENCOUNTER
Continued Stay SW/CM Assessment/Plan of Care Note       Active Substitute Decision Maker (SDM)    There are no active Substitute Decision Maker (SDM) on file.           Progress note:  Peer's coverage in writer's absence truly appreciated. Per OFTs pt deciding on whether to have angiogram; has ongoing pain control issues; has ID and Podiatry and Cardiology consults; has been declining PT; is from [place] and recently left AMA from [facility]; unclear as to weight-bearing status.     Today's PT note reflects pt requested no therapy today; very low motivation and drive to participate; if pt refuses therapy again tomorrow PT will be discontinued.      Will continue to follow for discharge disposition.  See / flowsheets for other objective data.    Disposition Recommendations:  Preliminary discharge destination:    / recommendation for discharge: Home, Home care, Home therapy    Discharge Plan/Needs:     Continued Care and Services - Admitted Since 8/29/2023    Coordination has not been started for this encounter.     Selected Continued Care - Episodes Includes continued care and service providers with selected services from the active episodes listed below    Care Transitions Episode start date: 8/28/2023   There are no active outsourced providers for this episode.                     Prior To Hospitalization:    Living Situation: Children and residing at [facility]    .  Support Systems: Family members   Home Devices/Equipment: Blood glucose monitor, Mobility assist device, Blood pressure monitor ,   ,    ,      Mobility Assist Devices: Front-wheeled walker   Type of Service Prior to Hospitalization: None ,   ,   ,   ,    ,       Patient/Family discharge goal (s):  Home, Home Care, Home therapy     Resources provided:           Therapy Recommendations for Discharge:   PT:      Last Filed Values     None        OT:       Last Filed Values     None        SLP:    Last Filed Values     None          Mobility  Reason for call:  Other   Patient called regarding (reason for call): pharm recieved a discontinue for nicotine lozenges, can  these be reinstated at the [facility] pharm blmgtn called in by Vianca / program  coordinator  Additional comments:     Phone number to reach patient:  Home number on file [phone] (home)    Best Time:  any    Can we leave a detailed message on this number?  YES     Equipment Recommended for Discharge:        Barriers to Discharge  Identified Barriers to Discharge/Transition Planning:

## 2024-04-15 NOTE — TELEPHONE ENCOUNTER
RN returned call to Lucina and notified her of provider message below as written.  Lucina requests that RN notify [facility] pharmacy of provider message below so that they stop putting the medication on pt's MAR.  RN contacted Pharmacist on duty and notified of provider message below as written. Pharmacist on duty states the naproxen will be removed from pt's MAR.    LINETTE SampsonN, RN       Patient's belongings returned
